# Patient Record
Sex: FEMALE | Race: ASIAN | NOT HISPANIC OR LATINO | Employment: FULL TIME | URBAN - METROPOLITAN AREA
[De-identification: names, ages, dates, MRNs, and addresses within clinical notes are randomized per-mention and may not be internally consistent; named-entity substitution may affect disease eponyms.]

---

## 2017-06-13 ENCOUNTER — OFFICE VISIT (OUTPATIENT)
Dept: FAMILY MEDICINE | Facility: CLINIC | Age: 22
End: 2017-06-13

## 2017-06-13 VITALS
DIASTOLIC BLOOD PRESSURE: 84 MMHG | BODY MASS INDEX: 28 KG/M2 | HEART RATE: 101 BPM | HEIGHT: 63 IN | OXYGEN SATURATION: 100 % | SYSTOLIC BLOOD PRESSURE: 128 MMHG | RESPIRATION RATE: 18 BRPM | TEMPERATURE: 98.8 F | WEIGHT: 158 LBS

## 2017-06-13 DIAGNOSIS — N91.2 AMENORRHEA: Primary | ICD-10-CM

## 2017-06-13 DIAGNOSIS — Z32.00 ENCOUNTER FOR CONFIRMATION OF PREGNANCY TEST RESULT WITH PHYSICAL EXAMINATION: ICD-10-CM

## 2017-06-13 LAB — HCG UR QL: POSITIVE

## 2017-06-13 NOTE — MR AVS SNAPSHOT
After Visit Summary   2017    Sarah Will    MRN: [id number]           Patient Information     Date Of Birth          1995        Visit Information        Provider Department      2017 4:20 PM Zulma Pabon MD [facility]        Today's Diagnoses     Amenorrhea    -  1    Encounter for confirmation of pregnancy test result with physical examination           Follow-ups after your visit        Your next 10 appointments already scheduled     2017  3:00 PM CDT   NEW OB with Zulma Pabon MD   [facility] ([facility])    [address] [place] [address]   [phone]              Who to contact     Please call your clinic at [phone] to:    Ask questions about your health    Make or cancel appointments    Discuss your medicines    Learn about your test results    Speak to your doctor   If you have compliments or concerns about an experience at your clinic, or if you wish to file a complaint, please contact [facility] Patient Relations at [phone] or email us at [email or web address]         Additional Information About Your Visit        MyChart Information     trueEX is an electronic gateway that provides easy, online access to your medical records. With trueEX, you can request a clinic appointment, read your test results, renew a prescription or communicate with your care team.     To sign up for trueEX visit the website at [email or web address]   You will be asked to enter the access code listed below, as well as some personal information. Please follow the directions to create your username and password.     Your access code is: [email or web address]  Expires: 2017  4:04 PM     Your access code will  in 90 days. If you need help or a new code, please contact your [facility] or call [phone] for assistance.        Care EveryWhere ID   "   This is your Care EveryWhere ID. This could be used by other organizations to access your [place] medical records  [id number]        Your Vitals Were     Pulse Temperature Respirations Height Last Period Pulse Oximetry    101 98.8  F (37.1  C) 18 5' 3\" (160 cm) 04/27/2017 100%    BMI (Body Mass Index)                   27.99 kg/m2            Blood Pressure from Last 3 Encounters:   06/13/17 128/84    Weight from Last 3 Encounters:   06/13/17 158 lb (71.7 kg)              We Performed the Following     HCG Qualitative Urine (UPT)  ([facility])        Primary Care Provider Office Phone # Fax #    Zulma Pabon [phone]       [facility] [address] [place] [address]        Equal Access to Services     [facility]: Hadii aad ku hadasho Soomaali, waaxda luqadaha, qaybta kaalmada adeegyada, waxay idiin hayaan kris maganaarapablo roberts . So [facility] [phone].    ATENCIÓN: Si habla español, tiene a cox disposición servicios gratuitos de asistencia lingüística. Llame al [phone].    We comply with applicable federal civil rights laws and [place] laws. We do not discriminate on the basis of race, color, national origin, age, disability sex, sexual orientation or gender identity.            Thank you!     Thank you for choosing [facility]  for your care. Our goal is always to provide you with excellent care. Hearing back from our patients is one way we can continue to improve our services. Please take a few minutes to complete the written survey that you may receive in the mail after your visit with us. Thank you!             Your Updated Medication List - Protect others around you: Learn how to safely use, store and throw away your medicines at www.disposemymeds.org.      Notice  As of 6/13/2017 11:59 PM    You have not been prescribed any medications.      "

## 2017-06-13 NOTE — PROGRESS NOTES
"       HPI:       Sarah Will is a 21 year old  female who presents to address the following concerns:    Positive pregnancy test at home:  Family was trying   Went to [facility] for previous pregnancy  Wants to see Dr Pabon specifically  Last period was April 27th    Has been pregnancy one other time and has 4 year old daughter.  Loan was born 2 days late    Tired but no other reported sx.  No excessive nausea or vomiting.  No blood loss, discharge or pain           PMHX:     There is no problem list on file for this patient.      No current outpatient prescriptions on file.          Allergies   Allergen Reactions     No Known Allergies        Results for orders placed or performed in visit on 06/13/17 (from the past 24 hour(s))   HCG Qualitative Urine (UPT)  (P )   Result Value Ref Range    HCG Qual Urine POSITIVE Negative       No current outpatient prescriptions on file.     No current facility-administered medications for this visit.               Review of Systems:   ROS as described above.  Denies F/S/C/N/V/SOB/CP          Physical Exam:     Vitals:    06/13/17 1603   BP: 128/84   Pulse: 101   Resp: 18   Temp: 98.8  F (37.1  C)   SpO2: 100%   Weight: 158 lb (71.7 kg)   Height: 5' 3\" (160 cm)     Body mass index is 27.99 kg/(m^2).    GEN: patient sitting comfortably in NAD  HEEN: Head is atraumatic, normocephalic, eyes anicteric, mucous membranes moist  CV: RRR w/o M/R/G  PULM: CTAB without w/r/r  ABD: soft, nontender, bowel sounds present  NEURO: Alert and oriented x3.  No focal motor abnormalities.  Face symmetric.  PSYCH: appropriate  SKIN: No rashes, bruising, or other lesions    Results for orders placed or performed in visit on 06/13/17   HCG Qualitative Urine (UPT)  (P )   Result Value Ref Range    HCG Qual Urine POSITIVE Negative       Assessment and Plan     1. Amenorrhea-upt positive in clinic today.  Will have patient follow up with OB coordinators to set up OB care at [facility].  Patient " has prenatal vitamins at home  - HCG Qualitative Urine (UPT)  ([facility])      Options for treatment and follow-up care were reviewed with the patient and/or guardian. Sarah Will and/or guardian engaged in the decision making process and verbalized understanding of the options discussed and agreed with the final plan.    Zulma Pabon MD

## 2017-06-22 ENCOUNTER — TELEPHONE (OUTPATIENT)
Dept: FAMILY MEDICINE | Facility: CLINIC | Age: 22
End: 2017-06-22

## 2017-06-22 NOTE — TELEPHONE ENCOUNTER
Met with patient and FOB in clinic when here for UPT and given OB/clinic information.     OB intake completed via phone. This is a . Pregnancy history of normal term vaginal delivery. No complication during pregnancy nor delivery. No history of medical condition. No family history of medical condition. No genetic history nor risk.    LMP 17, Sure date, regular menstrual cycles. Planned pregnancy. She and FOB are happy. They have a four year old daughter.  Pt has been feeling well this pregnancy, only tired. Patient works full time doing soft goods clothing production work. Voiced no other concern.    Will be seeing Dr. Pabon for OB care.

## 2017-06-27 ENCOUNTER — OFFICE VISIT (OUTPATIENT)
Dept: FAMILY MEDICINE | Facility: CLINIC | Age: 22
End: 2017-06-27

## 2017-06-27 VITALS
HEART RATE: 88 BPM | WEIGHT: 158.4 LBS | RESPIRATION RATE: 16 BRPM | HEIGHT: 63 IN | SYSTOLIC BLOOD PRESSURE: 129 MMHG | DIASTOLIC BLOOD PRESSURE: 87 MMHG | OXYGEN SATURATION: 100 % | TEMPERATURE: 98.1 F | BODY MASS INDEX: 28.07 KG/M2

## 2017-06-27 DIAGNOSIS — Z34.91 NORMAL PREGNANCY IN FIRST TRIMESTER: Primary | ICD-10-CM

## 2017-06-27 LAB
HEMOGLOBIN: 13.2 G/DL (ref 11.7–15.7)
HIV 1+2 AB+HIV1 P24 AG SERPL QL IA: NEGATIVE

## 2017-06-27 NOTE — PROGRESS NOTES
Sarah Will is a 21 year old  female who presents to clinic for a new OB visit.  Her last menstrual period was 17.  Sure of date.      Sx include fatigue but no nausea or vomiting.     Estimated Date of Delivery: 17 via LMP    She has not had bleeding since her LMP. She has had some mild intermittent cramping that does not stop her from what she is doing.  Unchanged from previous had any abdominal pain or cramping since her LMP. She has not had nausea. Weigh loss has not occurred. This was a planned pregnancy.     OTHER CONCERNS: no    Pre Term Labor Risk Assessment  Is the patient's age <18 or >40? No  Patint's BMI is Body mass index is 28.06 kg/(m^2).. Does patient have a BMI < 18.5? No  If previous pregnancy, was delivery within previous 6 months? No  Have you ever delivered a baby prior to 37 weeks gestation? No  Did conception for this pregnancy occur via In Vitro Fertilization? No  Summary: Patient is not high risk for  Labor for  labor.    There is no problem list on file for this patient.      Obstetric History       T1      L1     SAB0   TAB0   Ectopic0   Multiple0   Live Births1       # Outcome Date GA Lbr David/2nd Weight Sex Delivery Anes PTL Lv   2 Current            1 Term 12   6 lb 14 oz (3.118 kg) F Vag-Spont   ADRIENNE          History reviewed. No pertinent past medical history.    Past Surgical History:   Procedure Laterality Date     C AFF UPPER ARM/ELBOW SURGERY UNLISTED Left     Child, broken arm, pins        No current outpatient prescriptions on file.       Do you have a history of any of the following medical conditions?  Condition Yes/No   Hypertension No   Heart disease, mitral valve prolapse, rheumatic fever No   Asthma or another chronic lung disease No   An autoimmune disorder No   Kidney disease No   Frequent urinary tract infections No   Migraine headaches No   Stroke, loss of sensation/function, seizures, or other neuro problem No    Diabetes No   Thyroid problems or have you taken thyroid medication No   Hepatitis, liver disease, jaundice No   Blood clots, phlebitis, pulmonary embolism or varicose veins No   Excessive bleeding after surgery or dental work No   Heavy menstrual periods requiring treatment No   Anemia No   Blood transfusions No        Would you refuse a blood transfusion? No   Breast problems No   Abnormalities of the uterus No   Abnormal pap smear No   Have you been treated for an emotional disturbance? No   Have you been physically, sexually, or emotionally hurt by someone? No   Have you been in a major accident or suffered serious trauma? No   Have you had surgical procedures? No        Have you ever had any complications from anesthesia? No   Have you ever been hospitalized for a nonsurgical reason? No   Notes for positives: none    Prenatal Genetic Screening  Do you have a history of any of the following Yes/No        A metabolic disorder (e.g. Insulin-dependent DM, PKU) No        Recurrent pregnancy loss No     Do you, the baby's father, or anyone in your families have Yes/No        Thalassemia AND MCV <80 No        Hemophilia No        Neural tube defect No        Congenital heart defect No        Sickle cell disease or trait No        Muscular dystrophy No        Cystic fibrosis No        Mental retardation or autism No        Down's syndrome No        Ramu-Sach's disease No        Sandgap's chorea No        Any other inherited genetic or chromosomal disorder No        A child with birth defects not listed above No     Does not know biologic father.  Hx maternal side.    Infection History  At high risk for coming in contact with HIV No   Ever treated for tuberculosis No   Ever received the BCG vaccine for tuberculosis No   Ever had genital herpes (or has your partner) No   Had a rash or viral illness since LMP No   Ever had a sexually transmitted infection No   Ever had chicken pox or the vaccine Yes   Ever had any  "other serious infectious disease No   Up to date with immunizations Yes   STI History none      PERSONAL/SOCIAL HISTORY  Social History     Social History     Marital status: Single     Spouse name: N/A     Number of children: 1     Years of education: High School     Occupational History     Softgood clothing production      Full Time     Social History Main Topics     Smoking status: Never Smoker     Smokeless tobacco: None     Alcohol use Yes      Comment: there and here, not alot, none during pregnancy     Drug use: No     Sexual activity: Yes     Partners: Male     Other Topics Concern     None     Social History Narrative     Have you used any tobacco products, alcohol or any other drugs during this pregnancy before or after your knew you were pregnant? no    REVIEW OF SYSTEMS  C: NEGATIVE for fever, chills, change in weight  E: NEGATIVE for vision changes or irritation  ENT: NEGATIVE for ear, mouth and throat problems  R: NEGATIVE for significant cough or SOB  B: NEGATIVE for masses, tenderness or discharge  CV: NEGATIVE for chest pain, palpitations or peripheral edema  GI: NEGATIVE for nausea, abdominal pain, heartburn, or change in bowel habits  : NEGATIVE for unusual urinary or vaginal symptoms.   M: NEGATIVE for significant arthralgias or myalgia  N: NEGATIVE for weakness, dizziness or paresthesias  P: NEGATIVE for changes in mood or affect    PHYSICAL EXAM:  /87 (BP Location: Left arm, Patient Position: Sitting, Cuff Size: Adult Regular)  Pulse 88  Temp 98.1  F (36.7  C) (Oral)  Resp 16  Ht 5' 3\" (160 cm)  Wt 158 lb 6.4 oz (71.8 kg)  LMP 04/27/2017  SpO2 100%  BMI 28.06 kg/m2   GENERAL:  Pleasant pregnant female, alert, well groomed.  SKIN:  Warm and dry, without lesions or rashes  EYES:  PERRLA, EOM intact  MOUTH:  Buccal mucosa pink, moist without lesions.    NECK:  Thyroid without enlargement and nodules.  No cervical lymphadenopathy.  LUNGS:  Clear to auscultation.  BREAST:  " Symmetrical. No masses noted. No skin or nipple changes or axillary nodes.   HEART:  RRR without murmur.  ABDOMEN: Soft without masses , tenderness or organomegaly.  No CVA tenderness. No scars noted.. Fundus palpable at symphysis pubis. FHT not assessed  MUSCULOSKELETAL:  Full range of motion.  EXTREMITIES:  No edema. No significant varicosities.   GENITALIA:  Normal appearing anatomy, no lesions noted.  VAGINA:  Pink, normal rugae and discharge normal and physiologic,   CERVIX:  smooth, without discharge and parous os; firm, closed and long on bimanual exam.  UTERUS: Anteverted, nontender 6 weeks in size.  ADNEXA: Without masses or tenderness    ASSESSMENT/PLAN  There is no problem list on file for this patient.      Routine prenatal labs today. CBC, type and screen, rubella, HIV, gonorrhea/chlamydia, RPR, hepatitis B, urinalysis with culture. Pap smear due, done today.   1. Normal pregnancy in first trimester  - ABO/Rh Type-HML ([facility])  - Antibody Screen ([facility])  - Hepatitis B Surface Ag ([facility])  - Syphilis Screen [place] ([facility])  - HIV Ag/Ab Screen [place] ([facility])  - Rubella  IgG ([facility])  - Chlamydia/Gono Amplified ([facility])  - GYN Cytology ([facility])  - Hemoglobin (HGB) ([facility])  .     Discussed:  -recommended weight gain of 25-35 lbs. for BMI of Body mass index is 28.06 kg/(m^2)..  -Influenza vaccine discussed and declined.  -genetic screening options, including false positive rate of 5% with screening tests and diagnostic options (chorionic villus sampling, amniocentesis), and patient declines.   .  -safe medications during pregnancy. Is currently taking prenatal vitamin daily.   -healthy habits including not using tobacco or alcohol, exercising regularly and maintaining healthy diet  -information given on tips for dealing with nausea, healthy habits, exposures, safety, prenatal appointment schedule, and when to call the doctor.  -recommendations for breastfeeding.    Will  follow up in 4 weeks for routine pre- care.    Zulma Pabon MD

## 2017-06-27 NOTE — LETTER
July 6, 2017      Sarah Will  [address] [address]        Dear Sarah,    Please see below for your test results.  These are al normal.     Resulted Orders   ABO/Rh Type-HML ([facility])   Result Value Ref Range    ABO/Rh(D) O POS     Repeat ABO/Rh Typing (HML) O POS     Narrative    Test performed by:   [facility] [address], [place] [address]   Antibody Screen ([facility])   Result Value Ref Range    Antibody Screen Negative Negative    Narrative    Test performed by:   [facility]  [address], [place] [address]   Hepatitis B Surface Ag ([facility])   Result Value Ref Range    Hepatitis B Surface Antigen Negative Negative    Narrative    Test performed by:  [facility] [address] [place] [address]   Syphilis Screen [place] ([facility])   Result Value Ref Range    Syphilis Screen [place] Non-Reactive Non-Reactive    Narrative    Test performed by:  [facility] [address] [place] [address]   HIV Ag/Ab Screen [place] ([facility])   Result Value Ref Range    HIV Antigen/Antibody Negative Negative    Narrative    Test performed by:  [facility] [address] [place] [address]   Rubella  IgG ([facility])   Result Value Ref Range    Rubella IgG Immune Immune    Narrative    Test performed by:  [facility] [address] [place] [address]   Chlamydia/Gono Amplified ([facility])   Result Value Ref Range    Chlamydia trac,Amplified Prb Negative Negative    N gonorrhoeae,Amplified Prb Negative Negative    Narrative    Test performed by:  [facility] [address] [place] [address]   GYN Cytology ([facility])   Result Value Ref Range    Lab AP Case Report SEE RESULTS BELOW       Comment:      Gynecologic Cytology Report                       Case: [id number]                                     Authorizing Provider:  Zulma Pabon MD Collected:             06/27/2017 2444               First Screen:          DAVID Redman       Received:              06/28/2017 1113                                     (ASCP)                                                                         Specimen:    SUREPATH PAP, SCREENING, Endocervical/cervical                                               Lab AP Gyn Interpretation SEE RESULTS BELOW       Comment:      Negative for squamous intraepithelial lesion or malignancy  Electronically signed by DAVID Redman (ASCP) on 7/5/2017 at  1:40 PM      Lab AP Malignant? Normal Normal    Specimen adequacy:       Satisfactory for eval, endocerv/transform zone component absent, patient pregnant    HPV Reflex? No     High Risk? No     Last Mens Period 4/27/17     Lab AP Abnormal Bleeding No     Lab AP Patient Status pregnant     Lab AP Birth Control/Hormones None     Lab AP Previous Normal none     Lab AP Previous Abnl this is first pap     Lab AP Cervical Appearance normal     Narrative    Test performed by:  [facility] [address] [place] [address]   Hemoglobin (HGB) ([facility])   Result Value Ref Range    Hemoglobin 13.2 11.7 - 15.7 g/dL       If you have any questions, please call the clinic to make an appointment.    Sincerely,    Zulma Pabon MD

## 2017-06-27 NOTE — MR AVS SNAPSHOT
"              After Visit Summary   2017    Sarah Will    MRN: [id number]           Patient Information     Date Of Birth          1995        Visit Information        Provider Department      2017 3:00 PM Zulma Pabon MD [facility]        Today's Diagnoses     Normal pregnancy in first trimester    -  1       Follow-ups after your visit        Who to contact     Please call your clinic at [phone] to:    Ask questions about your health    Make or cancel appointments    Discuss your medicines    Learn about your test results    Speak to your doctor   If you have compliments or concerns about an experience at your clinic, or if you wish to file a complaint, please contact [facility] Patient Relations at [phone] or email us at [email or web address]         Additional Information About Your Visit        MyChart Information     ticketea is an electronic gateway that provides easy, online access to your medical records. With ticketea, you can request a clinic appointment, read your test results, renew a prescription or communicate with your care team.     To sign up for Flippst visit the website at [email or web address]   You will be asked to enter the access code listed below, as well as some personal information. Please follow the directions to create your username and password.     Your access code is: [email or web address]  Expires: 2017  4:04 PM     Your access code will  in 90 days. If you need help or a new code, please contact your [facility] or call [phone] for assistance.        Care EveryWhere ID     This is your Care EveryWhere ID. This could be used by other organizations to access your [place] medical records  [id number]        Your Vitals Were     Pulse Temperature Respirations Height Last Period Pulse Oximetry    88 98.1  F (36.7  C) (Oral) 16 5' 3\" (160 cm) 2017 100%    " BMI (Body Mass Index)                   28.06 kg/m2            Blood Pressure from Last 3 Encounters:   06/27/17 129/87   06/13/17 128/84    Weight from Last 3 Encounters:   06/27/17 158 lb 6.4 oz (71.8 kg)   06/13/17 158 lb (71.7 kg)              We Performed the Following     ABO/Rh Type-HML ([facility])     Antibody Screen ([facility])     Chlamydia/Gono Amplified ([facility])     Culture Urine ([facility])     GYN Cytology ([facility])     Hepatitis B Surface Ag ([facility])     HIV Ag/Ab Screen [place] ([facility])     Rubella  IgG ([facility])     Syphilis Screen [place] ([facility])     Urinalysis([facility])        Primary Care Provider Office Phone # Fax #    Zulma Pabon [phone]       [facility] [address] [place] [address]        Equal Access to Services     [facility]: Hadii aad ku hadasho Soomaali, waaxda luqadaha, qaybta kaalmada adeegyada, waxay allyin hayaan kris roberts . So [facility] [phone].    ATENCIÓN: Si habla español, tiene a cox disposición servicios gratuitos de asistencia lingüística. Llame al [phone].    We comply with applicable federal civil rights laws and [place] laws. We do not discriminate on the basis of race, color, national origin, age, disability sex, sexual orientation or gender identity.            Thank you!     Thank you for choosing [facility]  for your care. Our goal is always to provide you with excellent care. Hearing back from our patients is one way we can continue to improve our services. Please take a few minutes to complete the written survey that you may receive in the mail after your visit with us. Thank you!             Your Updated Medication List - Protect others around you: Learn how to safely use, store and throw away your medicines at www.disposemymeds.org.      Notice  As of 6/27/2017  3:58 PM    You have not been prescribed any medications.

## 2017-06-28 LAB
ABO + RH BLD: NORMAL
BLD GP AB SCN SERPL QL: NEGATIVE
C TRACH RRNA CVX QL NAA+PROBE: NEGATIVE
HBV SURFACE AG SERPL QL IA: NEGATIVE
N GONORRHOEA RRNA SPEC QL NAA+PROBE: NEGATIVE
REPEAT ABO/RH TYPING (HML): NORMAL
RPR SER QL: NORMAL
RUBV IGG SERPL QL IA: NORMAL

## 2017-07-05 ENCOUNTER — RECORDS - HEALTHEAST (OUTPATIENT)
Dept: ADMINISTRATIVE | Facility: OTHER | Age: 22
End: 2017-07-05

## 2017-07-05 LAB
HIGH RISK?: NO
HPV REFLEX?: NO
LAB AP ABNORMAL BLEEDING: NO
LAB AP BIRTH CONTROL/HORMONES: NORMAL
LAB AP CASE REPORT: NORMAL
LAB AP CERVICAL APPEARANCE: NORMAL
LAB AP GYN INTERPRETATION: NORMAL
LAB AP MALIGNANT?: NORMAL
LAB AP PATIENT STATUS: NORMAL
LAB AP PREVIOUS ABNL: NORMAL
LAB AP PREVIOUS NORMAL: NORMAL
LAST MENS PERIOD: NORMAL
SPECIMEN ADEQUACY:: NORMAL

## 2017-07-24 ENCOUNTER — OFFICE VISIT (OUTPATIENT)
Dept: FAMILY MEDICINE | Facility: CLINIC | Age: 22
End: 2017-07-24

## 2017-07-24 VITALS
HEART RATE: 92 BPM | HEIGHT: 63 IN | SYSTOLIC BLOOD PRESSURE: 107 MMHG | DIASTOLIC BLOOD PRESSURE: 74 MMHG | OXYGEN SATURATION: 100 % | TEMPERATURE: 98.3 F | WEIGHT: 158.8 LBS | BODY MASS INDEX: 28.14 KG/M2

## 2017-07-24 DIAGNOSIS — Z34.01 ENCOUNTER FOR SUPERVISION OF NORMAL FIRST PREGNANCY IN FIRST TRIMESTER: Primary | ICD-10-CM

## 2017-07-24 NOTE — PROGRESS NOTES
"Sarah Will is a 22 year old  female who presents to clinic for a follow up OB visit. She is currently 74wpcoy8sop with an estimated date of delivery of 18 via LMP 17. She denies headache, chest pain, shortness of breath, abdominal pain/contractions, vaginal bleeding, or abnormal discharge. She has not felt baby move.     New concerns today: none at this time. Since last visit, pt reported some morning sickness/nausea with 2 episodes of emesis that resolved.     Obstetric History       T1      L1     SAB0   TAB0   Ectopic0   Multiple0   Live Births1       # Outcome Date GA Lbr David/2nd Weight Sex Delivery Anes PTL Lv   2 Current            1 Term 12   6 lb 14 oz (3.118 kg) F Vag-Spont   ADRIENNE          Physical exam:  /74  Pulse 92  Temp 98.3  F (36.8  C) (Oral)  Ht 5' 3\" (160 cm)  Wt 158 lb 12.8 oz (72 kg)  LMP 2017  SpO2 100%  BMI 28.13 kg/m2    General: alert, female in no acute distress  Abdomen: gravid uterus appropriate for gestation age above pubic symphysis, non-tender, FHTs 160  Extremities: no edema    Results for orders placed or performed in visit on 17   ABO/Rh Type-HML ([facility])   Result Value Ref Range    ABO/Rh(D) O POS     Repeat ABO/Rh Typing (HML) O POS     Narrative    Test performed by:   [facility]  [address], [place] [address]   Antibody Screen ([facility])   Result Value Ref Range    Antibody Screen Negative Negative    Narrative    Test performed by:   [facility]  [address], [place] [address]   Hepatitis B Surface Ag ([facility])   Result Value Ref Range    Hepatitis B Surface Antigen Negative Negative    Narrative    Test performed by:  [facility] [address] [place] [address]   Syphilis Screen [place] ([facility])   Result Value Ref Range    Syphilis Screen [place] Non-Reactive Non-Reactive    Narrative    Test performed by:  [facility] [address] [place] [address] "   HIV Ag/Ab Screen [place] ([facility])   Result Value Ref Range    HIV Antigen/Antibody Negative Negative    Narrative    Test performed by:  [facility] [address] [place] [address]   Rubella  IgG ([facility])   Result Value Ref Range    Rubella IgG Immune Immune    Narrative    Test performed by:  [facility] [address] [place] [address]   Chlamydia/Gono Amplified ([facility])   Result Value Ref Range    Chlamydia trac,Amplified Prb Negative Negative    N gonorrhoeae,Amplified Prb Negative Negative    Narrative    Test performed by:  [facility] [address] [place] [address]   GYN Cytology ([facility])   Result Value Ref Range    Lab AP Case Report SEE RESULTS BELOW     Lab AP Gyn Interpretation SEE RESULTS BELOW     Lab AP Malignant? Normal Normal    Specimen adequacy:       Satisfactory for eval, endocerv/transform zone component absent, patient pregnant    HPV Reflex? No     High Risk? No     Last Mens Period 17     Lab AP Abnormal Bleeding No     Lab AP Patient Status pregnant     Lab AP Birth Control/Hormones None     Lab AP Previous Normal none     Lab AP Previous Abnl this is first pap     Lab AP Cervical Appearance normal     Narrative    Test performed by:  [facility] [address] [place] [address]   Hemoglobin (HGB) ([facility])   Result Value Ref Range    Hemoglobin 13.2 11.7 - 15.7 g/dL       Assessment/Plan:  Normal pregnancy.    Reviewed normal pre-saima labs.   Reviewed genetic screening options: patient declines at this time    Doing well.  Heart beat auscultated today and normal.  Continue PNV    Follow up in 4 weeks for routine prenatal visit.     Kamilla Molina, MS3 acting as a medical scribe for Dr. Cm Pabon MD    The medical student acted as a scribe and the encounter documented above was performed completely by me and the documentation accurately reflects the work I have performed today.       Zulma Pabon MD

## 2017-07-24 NOTE — MR AVS SNAPSHOT
After Visit Summary   2017    Sarah Will    MRN: [id number]           Patient Information     Date Of Birth          1995        Visit Information        Provider Department      2017 10:00 AM Zulma Pabon MD [facility]        Today's Diagnoses     Encounter for supervision of normal first pregnancy in first trimester    -  1       Follow-ups after your visit        Your next 10 appointments already scheduled     Aug 18, 2017  4:00 PM CDT   RETURN OB with Zulma Pabon MD   [facility] ([facility])    [address] [place] [address]   [phone]              Who to contact     Please call your clinic at [phone] to:    Ask questions about your health    Make or cancel appointments    Discuss your medicines    Learn about your test results    Speak to your doctor   If you have compliments or concerns about an experience at your clinic, or if you wish to file a complaint, please contact [facility] Patient Relations at [phone] or email us at [email or web address]         Additional Information About Your Visit        MyChart Information     EverZero is an electronic gateway that provides easy, online access to your medical records. With EverZero, you can request a clinic appointment, read your test results, renew a prescription or communicate with your care team.     To sign up for EverZero visit the website at [email or web address]   You will be asked to enter the access code listed below, as well as some personal information. Please follow the directions to create your username and password.     Your access code is: [email or web address]  Expires: 2017  4:04 PM     Your access code will  in 90 days. If you need help or a new code, please contact your [facility] or call [phone] for assistance.        Care EveryWhere ID     This is your Care  "EveryWhere ID. This could be used by other organizations to access your [place] medical records  [id number]        Your Vitals Were     Pulse Temperature Height Last Period Pulse Oximetry BMI (Body Mass Index)    92 98.3  F (36.8  C) (Oral) 5' 3\" (160 cm) 04/27/2017 100% 28.13 kg/m2       Blood Pressure from Last 3 Encounters:   07/24/17 107/74   06/27/17 129/87   06/13/17 128/84    Weight from Last 3 Encounters:   07/24/17 158 lb 12.8 oz (72 kg)   06/27/17 158 lb 6.4 oz (71.8 kg)   06/13/17 158 lb (71.7 kg)              Today, you had the following     No orders found for display       Primary Care Provider Office Phone # Fax #    Zulma Pabon [phone]       [facility] [address] [place] [address]        Equal Access to Services     [facility]: Hadii aad ku hadasho Soomaali, waaxda luqadaha, qaybta kaalmada adeegyada, waxay idiin hayaan adeeg chinoarapablo la'jose juan . So [facility] [phone].    ATENCIÓN: Si habla español, tiene a cox disposición servicios gratuitos de asistencia lingüística. Llame al [phone].    We comply with applicable federal civil rights laws and [place] laws. We do not discriminate on the basis of race, color, national origin, age, disability sex, sexual orientation or gender identity.            Thank you!     Thank you for choosing [facility]  for your care. Our goal is always to provide you with excellent care. Hearing back from our patients is one way we can continue to improve our services. Please take a few minutes to complete the written survey that you may receive in the mail after your visit with us. Thank you!             Your Updated Medication List - Protect others around you: Learn how to safely use, store and throw away your medicines at www.disposemymeds.org.      Notice  As of 7/24/2017 11:59 PM    You have not been prescribed any medications.      "

## 2017-07-24 NOTE — LETTER
RETURN TO WORK/SCHOOL FORM    7/24/2017    Re: Sarah Will  1995      To Whom It May Concern:     This letter is to confirm that Sarah Will is pregnant.       Zulma Pabon MD  7/24/2017 10:41 AM

## 2017-08-18 ENCOUNTER — OFFICE VISIT (OUTPATIENT)
Dept: FAMILY MEDICINE | Facility: CLINIC | Age: 22
End: 2017-08-18

## 2017-08-18 VITALS
TEMPERATURE: 98.5 F | HEIGHT: 63 IN | DIASTOLIC BLOOD PRESSURE: 76 MMHG | SYSTOLIC BLOOD PRESSURE: 117 MMHG | HEART RATE: 85 BPM | WEIGHT: 163 LBS | BODY MASS INDEX: 28.88 KG/M2 | OXYGEN SATURATION: 100 % | RESPIRATION RATE: 19 BRPM

## 2017-08-18 DIAGNOSIS — Z34.92 NORMAL PREGNANCY, SECOND TRIMESTER: Primary | ICD-10-CM

## 2017-08-18 NOTE — PROGRESS NOTES
"Sarah Will is a 22 year old  female who presents to clinic for a follow up OB visit. She is currently 16w1d with an estimated date of delivery of 2018 via LMP. She denies headache, chest pain, shortness of breath, abdominal pain/contractions, vaginal bleeding, or abnormal discharge. She has not felt baby move.     New concerns today:   none    Obstetric History       T1      L1     SAB0   TAB0   Ectopic0   Multiple0   Live Births1       # Outcome Date GA Lbr David/2nd Weight Sex Delivery Anes PTL Lv   2 Current            1 Term 12   6 lb 14 oz (3.118 kg) F Vag-Spont   ADRIENNE          Physical exam:  /76  Pulse 85  Temp 98.5  F (36.9  C)  Resp 19  Ht 5' 3\" (160 cm)  Wt 163 lb (73.9 kg)  LMP 2017  SpO2 100%  BMI 28.87 kg/m2    General: alert, female in no acute distress  Abdomen: gravid uterus appropriate for gestation age above pubic symphysis, non-tender, FHTs 2  Extremities: no edema    Assessment/Plan:  There is no problem list on file for this patient.      Reviewed pre-saima labs. Follow up US, MERLY 4 weeks.  Declined genetic testing  Discussed warning signs to watch for and expectations for second trimester.     Follow up in 4 weeks for routine prenatal visit.     Zulma Pabon MD            "

## 2017-08-18 NOTE — MR AVS SNAPSHOT
After Visit Summary   8/18/2017    Sarah Will    MRN: [id number]           Patient Information     Date Of Birth          1995        Visit Information        Provider Department      8/18/2017 4:00 PM Zulma Pabon MD [facility]        Today's Diagnoses     Normal pregnancy, second trimester    -  1       Follow-ups after your visit        Your next 10 appointments already scheduled     Sep 14, 2017  8:00 AM CDT   ULTRASOUND with Madiha CHRIS Hebert   [facility] ([facility])    [address] [place] [address]   [phone]           An adult must be present if children accompany the patient being seen.            Sep 15, 2017  8:20 AM CDT   RETURN OB with Zulma Pabon MD   [facility] ([facility])    [address] [place] [address]   [phone]              Who to contact     Please call your clinic at [phone] to:    Ask questions about your health    Make or cancel appointments    Discuss your medicines    Learn about your test results    Speak to your doctor   If you have compliments or concerns about an experience at your clinic, or if you wish to file a complaint, please contact [facility] Patient Relations at [phone] or email us at [email or web address]         Additional Information About Your Visit        MyChart Information     Firework is an electronic gateway that provides easy, online access to your medical records. With Firework, you can request a clinic appointment, read your test results, renew a prescription or communicate with your care team.     To sign up for Jaspersoftt visit the website at [email or web address]   You will be asked to enter the access code listed below, as well as some personal information. Please follow the directions to create your username and password.     Your access code is: [email or web address]  Expires: 9/11/2017  4:04  "PM     Your access code will  in 90 days. If you need help or a new code, please contact your [facility] or call [phone] for assistance.        Care EveryWhere ID     This is your Care EveryWhere ID. This could be used by other organizations to access your [place] medical records  [id number]        Your Vitals Were     Pulse Temperature Respirations Height Last Period Pulse Oximetry    85 98.5  F (36.9  C) 19 5' 3\" (160 cm) 2017 100%    BMI (Body Mass Index)                   28.87 kg/m2            Blood Pressure from Last 3 Encounters:   17 117/76   17 107/74   17 129/87    Weight from Last 3 Encounters:   17 163 lb (73.9 kg)   17 158 lb 12.8 oz (72 kg)   17 158 lb 6.4 oz (71.8 kg)              Today, you had the following     No orders found for display       Primary Care Provider Office Phone # Fax #    Zulma Nalini Pabon [phone]       [facility] [address] [place] [address]        Equal Access to Services     [facility] : Hadii aad ku hadasho Soomaali, waaxda luqadaha, qaybta kaalmada adeegyada, waxay idiin hayaan adeeg chinoarapablo la'cristofern ah. So [facility] [phone].    ATENCIÓN: Si habla español, tiene a cox disposición servicios gratuitos de asistencia lingüística. [facility] [phone].    We comply with applicable federal civil rights laws and [place] laws. We do not discriminate on the basis of race, color, national origin, age, disability sex, sexual orientation or gender identity.            Thank you!     Thank you for choosing [facility]  for your care. Our goal is always to provide you with excellent care. Hearing back from our patients is one way we can continue to improve our services. Please take a few minutes to complete the written survey that you may receive in the mail after your visit with us. Thank you!             Your Updated Medication List - Protect others " around you: Learn how to safely use, store and throw away your medicines at www.disposemymeds.org.      Notice  As of 8/18/2017 11:59 PM    You have not been prescribed any medications.

## 2017-08-27 PROBLEM — Z34.92 NORMAL PREGNANCY, SECOND TRIMESTER: Status: ACTIVE | Noted: 2017-08-27

## 2017-09-14 DIAGNOSIS — Z34.82 ENCOUNTER FOR SUPERVISION OF OTHER NORMAL PREGNANCY, SECOND TRIMESTER: Primary | ICD-10-CM

## 2017-09-15 ENCOUNTER — OFFICE VISIT (OUTPATIENT)
Dept: FAMILY MEDICINE | Facility: CLINIC | Age: 22
End: 2017-09-15

## 2017-09-15 VITALS
DIASTOLIC BLOOD PRESSURE: 77 MMHG | HEART RATE: 81 BPM | BODY MASS INDEX: 29.1 KG/M2 | WEIGHT: 164.25 LBS | OXYGEN SATURATION: 100 % | RESPIRATION RATE: 19 BRPM | HEIGHT: 63 IN | SYSTOLIC BLOOD PRESSURE: 122 MMHG | TEMPERATURE: 98 F

## 2017-09-15 DIAGNOSIS — Z34.92 NORMAL PREGNANCY, SECOND TRIMESTER: Primary | ICD-10-CM

## 2017-09-15 NOTE — PROGRESS NOTES
"Sarah Will is a 22 year old  female who presents to clinic for a follow up OB visit. She is currently 20w1d with an estimated date of delivery of 2018 via LMP. She denies headache, chest pain, shortness of breath, abdominal pain/contractions, vaginal bleeding, or abnormal discharge. She has felt baby move. Unsureof this    New concerns today:   Recent URI-daughter was also sick for a breif period    US yesterday.  It's a BOY    Obstetric History       T1      L1     SAB0   TAB0   Ectopic0   Multiple0   Live Births1       # Outcome Date GA Lbr David/2nd Weight Sex Delivery Anes PTL Lv   2 Current            1 Term 12   6 lb 14 oz (3.118 kg) F Vag-Spont   ADRIENNE          Physical exam:  /77  Pulse 81  Temp 98  F (36.7  C) (Oral)  Resp 19  Ht 5' 3\" (160 cm)  Wt 164 lb 4 oz (74.5 kg)  LMP 2017  SpO2 100%  BMI 29.1 kg/m2    General: alert, female in no acute distress  Abdomen: gravid uterus appropriate for gestation age above pubic symphysis, non-tender, FHTs 140s  Extremities: no edema    US sdone yestedfay but not available for read.  WIll follow up   Assessment/Plan:  Patient Active Problem List   Diagnosis     Normal pregnancy, second trimester       Fetal anatomy ultrasound planned for 22 weeks.  Discussed expectations of second trimester and when to call/come in.  Continued encouragement of breastfeeding.    Follow up in 4 weeks for routine prenatal visit and fetal survey.  Will come with daughter.   Discussed care for common cold in pregnancy  Flu shot today    Zulma Pabon MD      "

## 2017-09-15 NOTE — MR AVS SNAPSHOT
After Visit Summary   9/15/2017    Sarah Will    MRN: [id number]           Patient Information     Date Of Birth          1995        Visit Information        Provider Department      9/15/2017 8:20 AM Zulma Pabon MD [facility]        Today's Diagnoses     Normal pregnancy, second trimester    -  1      Care Instructions    [facility] Information  If you have any further concerns or wish to schedule another appointment, please call our office at [phone] during normal business hours (8-5, M-F).     For uncomplicated pregnancies, you will be seeing your doctor once a month until you are 28 weeks, then you will see your doctor twice a month. You will begin weekly visits at 36 weeks until you deliver.     If you have urgent medical questions that cannot wait, you may call [phone] at any time of day.     If you have a medical emergency, please call [phone].    When to call or come in to the hospital  If you notice a decrease in your baby's movement.   If your begin to experience contractions that are 5 minutes or less apart and lasting for over 45 seconds, or if contractions are becoming more painful.  If you have any bleeding or leakage of fluids.   If you have a headache not resolved with tylenol, right upper abdominal pain, or sudden onset of swelling.  You know your body best. Never hesitate to call or go to the hospital if something doesn't feel right!  Gestational Diabetes Screen  At your next visit, you will be screened for gestational diabetes. You will drink a sugary drink and then have your blood drawn to see how your body responds to a sugar load. This test takes about an hour to complete - please plan your schedule accordingly!  The Benefits of Breastfeeding   Breastfeeding gives your new baby the very best start. It supplies nutrition, comfort, and love. Experts agree: Breastfeeding is the healthiest choice for babies during the first year of life  and beyond. It s healthy for Mom, too. Breastfeeding may be challenging at first. But with support, you and your baby can succeed together.   Healthiest for Baby   Breastmilk is the ideal food for babies. It has all the nutrients your little one needs to grow healthy and strong. Here are some of the many benefits for your baby:   Breastfeeding provides contact that babies love. Frequent skin-to-skin time with Mom is calming and comforting.   Breastmilk is full of antibodies. These are substances that help your baby fight infection. Breastmilk reduces your baby's risk of respiratory illnesses, ear infections, and diarrhea.   Breastfeeding reduces your baby s risk of allergies, colds, and many other diseases.   Breastmilk changes as your baby grows, meeting her changing needs.   Breastmilk is easy for your baby to digest.   Breastmilk contains DHA, a fat that is good for your baby s developing brain and eyes.   Breastmilk decreases the risk of sudden infant death syndrome (SIDS).  Healthiest for Mom   For many women, breastfeeding is an amazing experience. It creates a strong bond between mother and baby. Other benefits for Mom include:   You can feel proud knowing that your baby is growing healthy and strong because of your milk.   Breastmilk is convenient. It s free, clean, and always the right temperature.   Breastfeeding burns calories. This can help you lose pregnancy weight faster.   Breastfeeding releases hormones that contract the uterus. This helps the uterus return to its normal size after childbirth.   Mothers who breastfeed have a decreased risk of ovarian and breast cancers.  There are many people who can help you learn to breastfeed. A lactation consultant is a healthcare provider specially trained to help breastfeeding moms. A lactation consultant will visit you after delivery and is available after your baby is born - if you'd like to meet with lactation prior to delivery, let your doctor know!  "              Follow-ups after your visit        Your next 10 appointments already scheduled     Oct 16, 2017  8:00 AM CDT   RETURN OB with Zulma Pabon MD   [facility] ([facility])    [address] [place] [address]   [phone]              Who to contact     Please call your clinic at [phone] to:    Ask questions about your health    Make or cancel appointments    Discuss your medicines    Learn about your test results    Speak to your doctor   If you have compliments or concerns about an experience at your clinic, or if you wish to file a complaint, please contact [facility] Patient Relations at [phone] or email us at [email or web address]         Additional Information About Your Visit        CertiVoxhart Information     Everypost is an electronic gateway that provides easy, online access to your medical records. With Everypost, you can request a clinic appointment, read your test results, renew a prescription or communicate with your care team.     To sign up for Everypost visit the website at [email or web address]   You will be asked to enter the access code listed below, as well as some personal information. Please follow the directions to create your username and password.     Your access code is: [email or web address]  Expires: 2017 11:27 AM     Your access code will  in 90 days. If you need help or a new code, please contact your [facility] or call [phone] for assistance.        Care EveryWhere ID     This is your Care EveryWhere ID. This could be used by other organizations to access your [place] medical records  [id number]        Your Vitals Were     Pulse Temperature Respirations Height Last Period Pulse Oximetry    81 98  F (36.7  C) (Oral) 19 5' 3\" (160 cm) 2017 100%    BMI (Body Mass Index)                   29.1 kg/m2            Blood Pressure from Last 3 Encounters: "   09/15/17 122/77   08/18/17 117/76   07/24/17 107/74    Weight from Last 3 Encounters:   09/15/17 164 lb 4 oz (74.5 kg)   08/18/17 163 lb (73.9 kg)   07/24/17 158 lb 12.8 oz (72 kg)              We Performed the Following     ADMIN VACCINE, INITIAL     Flu vaccine, quad, preserve-free, 0.5 ml        Primary Care Provider Office Phone # Fax #    Zulma Nalini Pabon [phone]       [facility] [address] [place] [address]        Equal Access to Services     [facility]: Hadii aad ku hadasho Soomaali, waaxda luqadaha, qaybta kaalmada adeegyada, shabbir wu haycristofern kris roberts . So [facility] [phone].    ATENCIÓN: Si habla español, tiene a cox disposición servicios gratuitos de asistencia lingüística. Llame al [phone].    We comply with applicable federal civil rights laws and [place] laws. We do not discriminate on the basis of race, color, national origin, age, disability sex, sexual orientation or gender identity.            Thank you!     Thank you for choosing [facility]  for your care. Our goal is always to provide you with excellent care. Hearing back from our patients is one way we can continue to improve our services. Please take a few minutes to complete the written survey that you may receive in the mail after your visit with us. Thank you!             Your Updated Medication List - Protect others around you: Learn how to safely use, store and throw away your medicines at www.disposemymeds.org.      Notice  As of 9/15/2017 11:27 AM    You have not been prescribed any medications.

## 2017-09-15 NOTE — NURSING NOTE
Sarah Will      1.  Has the patient received the information for the influenza vaccine? NO    2.  Does the patient have any of the following contraindications?     Allergy to eggs?  No     Allergic reaction to previous influenza vaccines?  No     Any other problems to previous influenza vaccines?  No     Paralyzed by Guillain-[place] syndrome?  No     Currently pregnant? Yes, 5 months along.     Current moderate or severe illness?  No    Vaccination given by MA.  Recorded by Amy [place]

## 2017-09-15 NOTE — PATIENT INSTRUCTIONS
[facility] Information  If you have any further concerns or wish to schedule another appointment, please call our office at [phone] during normal business hours (8-5, M-F).     For uncomplicated pregnancies, you will be seeing your doctor once a month until you are 28 weeks, then you will see your doctor twice a month. You will begin weekly visits at 36 weeks until you deliver.     If you have urgent medical questions that cannot wait, you may call [phone] at any time of day.     If you have a medical emergency, please call [phone].    When to call or come in to the hospital  If you notice a decrease in your baby's movement.   If your begin to experience contractions that are 5 minutes or less apart and lasting for over 45 seconds, or if contractions are becoming more painful.  If you have any bleeding or leakage of fluids.   If you have a headache not resolved with tylenol, right upper abdominal pain, or sudden onset of swelling.  You know your body best. Never hesitate to call or go to the hospital if something doesn't feel right!  Gestational Diabetes Screen  At your next visit, you will be screened for gestational diabetes. You will drink a sugary drink and then have your blood drawn to see how your body responds to a sugar load. This test takes about an hour to complete - please plan your schedule accordingly!  The Benefits of Breastfeeding   Breastfeeding gives your new baby the very best start. It supplies nutrition, comfort, and love. Experts agree: Breastfeeding is the healthiest choice for babies during the first year of life and beyond. It s healthy for Mom, too. Breastfeeding may be challenging at first. But with support, you and your baby can succeed together.   Healthiest for Baby   Breastmilk is the ideal food for babies. It has all the nutrients your little one needs to grow healthy and strong. Here are some of the many benefits for your baby:   Breastfeeding provides contact that  babies love. Frequent skin-to-skin time with Mom is calming and comforting.   Breastmilk is full of antibodies. These are substances that help your baby fight infection. Breastmilk reduces your baby's risk of respiratory illnesses, ear infections, and diarrhea.   Breastfeeding reduces your baby s risk of allergies, colds, and many other diseases.   Breastmilk changes as your baby grows, meeting her changing needs.   Breastmilk is easy for your baby to digest.   Breastmilk contains DHA, a fat that is good for your baby s developing brain and eyes.   Breastmilk decreases the risk of sudden infant death syndrome (SIDS).  Healthiest for Mom   For many women, breastfeeding is an amazing experience. It creates a strong bond between mother and baby. Other benefits for Mom include:   You can feel proud knowing that your baby is growing healthy and strong because of your milk.   Breastmilk is convenient. It s free, clean, and always the right temperature.   Breastfeeding burns calories. This can help you lose pregnancy weight faster.   Breastfeeding releases hormones that contract the uterus. This helps the uterus return to its normal size after childbirth.   Mothers who breastfeed have a decreased risk of ovarian and breast cancers.  There are many people who can help you learn to breastfeed. A lactation consultant is a healthcare provider specially trained to help breastfeeding moms. A lactation consultant will visit you after delivery and is available after your baby is born - if you'd like to meet with lactation prior to delivery, let your doctor know!

## 2017-10-16 ENCOUNTER — OFFICE VISIT (OUTPATIENT)
Dept: FAMILY MEDICINE | Facility: CLINIC | Age: 22
End: 2017-10-16

## 2017-10-16 VITALS
HEART RATE: 80 BPM | TEMPERATURE: 97.7 F | RESPIRATION RATE: 20 BRPM | OXYGEN SATURATION: 100 % | HEIGHT: 63 IN | WEIGHT: 169.8 LBS | DIASTOLIC BLOOD PRESSURE: 77 MMHG | SYSTOLIC BLOOD PRESSURE: 115 MMHG | BODY MASS INDEX: 30.09 KG/M2

## 2017-10-16 DIAGNOSIS — Z34.92 NORMAL PREGNANCY, SECOND TRIMESTER: Primary | ICD-10-CM

## 2017-10-16 NOTE — MR AVS SNAPSHOT
"              After Visit Summary   10/16/2017    Sarah Will    MRN: [id number]           Patient Information     Date Of Birth          1995        Visit Information        Provider Department      10/16/2017 8:00 AM Zulma Pabon MD [facility]        Today's Diagnoses     Normal pregnancy, second trimester    -  1       Follow-ups after your visit        Who to contact     Please call your clinic at [phone] to:    Ask questions about your health    Make or cancel appointments    Discuss your medicines    Learn about your test results    Speak to your doctor   If you have compliments or concerns about an experience at your clinic, or if you wish to file a complaint, please contact [facility] Patient Relations at [phone] or email us at [email or web address]         Additional Information About Your Visit        MyChart Information     Minervaxt is an electronic gateway that provides easy, online access to your medical records. With [email or web address], you can request a clinic appointment, read your test results, renew a prescription or communicate with your care team.     To sign up for Minervaxt visit the website at [email or web address]   You will be asked to enter the access code listed below, as well as some personal information. Please follow the directions to create your username and password.     Your access code is: [email or web address]  Expires: 2017 11:27 AM     Your access code will  in 90 days. If you need help or a new code, please contact your [facility] or call [phone] for assistance.        Care EveryWhere ID     This is your Care EveryWhere ID. This could be used by other organizations to access your [facility] medical records  [id number]        Your Vitals Were     Pulse Temperature Respirations Height Last Period Pulse Oximetry    80 97.7  F (36.5  C) 20 5' 3\" (160 cm) 2017 100%    BMI " (Body Mass Index)                   30.08 kg/m2            Blood Pressure from Last 3 Encounters:   10/16/17 115/77   09/15/17 122/77   08/18/17 117/76    Weight from Last 3 Encounters:   10/16/17 169 lb 12.8 oz (77 kg)   09/15/17 164 lb 4 oz (74.5 kg)   08/18/17 163 lb (73.9 kg)              Today, you had the following     No orders found for display       Primary Care Provider Office Phone # Fax #    Zulma Pabon [phone]       [facility] [address] [place] [address]        Equal Access to Services     [facility] SAY : Hadii cinda rothman hadasho Sosophieali, waaxda luqadaha, qaybta kaalmada adeegyada, shabbir roberts . So [facility] [phone].    ATENCIÓN: Si habla español, tiene a cox disposición servicios gratuitos de asistencia lingüística. Llame al [phone].    We comply with applicable federal civil rights laws and [place] laws. We do not discriminate on the basis of race, color, national origin, age, disability, sex, sexual orientation, or gender identity.            Thank you!     Thank you for choosing [facility]  for your care. Our goal is always to provide you with excellent care. Hearing back from our patients is one way we can continue to improve our services. Please take a few minutes to complete the written survey that you may receive in the mail after your visit with us. Thank you!             Your Updated Medication List - Protect others around you: Learn how to safely use, store and throw away your medicines at www.disposemymeds.org.      Notice  As of 10/16/2017  8:45 AM    You have not been prescribed any medications.

## 2017-10-16 NOTE — PROGRESS NOTES
"Sarah Will is a 22 year old  female who presents to clinic for a follow up OB visit. She is currently 24w4d with an estimated date of delivery of 2018 via early us. She denies headache, chest pain, shortness of breath, abdominal pain/contractions, vaginal bleeding, or abnormal discharge. She has felt baby move.     New concerns today:   No bleeding or discharge, no recent illness.  No pain    Obstetric History       T1      L1     SAB0   TAB0   Ectopic0   Multiple0   Live Births1       # Outcome Date GA Lbr David/2nd Weight Sex Delivery Anes PTL Lv   2 Current            1 Term 12   6 lb 14 oz (3.118 kg) F Vag-Spont   ADRIENNE          Physical exam:  /77  Pulse 80  Temp 97.7  F (36.5  C)  Resp 20  Ht 5' 3\" (160 cm)  Wt 169 lb 12.8 oz (77 kg)  LMP 2017  SpO2 100%  BMI 30.08 kg/m2    General: alert, female in no acute distress  Abdomen: gravid uterus appropriate for gestation age at 24 cm above pubic symphysis, non-tender, FHTs 140s  Extremities: no edema    Assessment/Plan:  Patient Active Problem List   Diagnosis     Normal pregnancy, second trimester     Next visit: GTT, TDAP, Hgb    Weight gain appropriate  Information given on gestational diabetes. 1 hour glucose tolerance test today.   Blood type O+ . Rhogam not indicated.  Discussed signs and symptoms of  labor.     Follow up in 4 weeks for routine prenatal visit.     Zulma Pabon MD          "

## 2017-11-10 ENCOUNTER — OFFICE VISIT (OUTPATIENT)
Dept: FAMILY MEDICINE | Facility: CLINIC | Age: 22
End: 2017-11-10

## 2017-11-10 VITALS
BODY MASS INDEX: 30.83 KG/M2 | DIASTOLIC BLOOD PRESSURE: 81 MMHG | SYSTOLIC BLOOD PRESSURE: 117 MMHG | HEIGHT: 63 IN | WEIGHT: 174 LBS | TEMPERATURE: 97.6 F | HEART RATE: 73 BPM

## 2017-11-10 DIAGNOSIS — Z23 IMMUNIZATION DUE: ICD-10-CM

## 2017-11-10 DIAGNOSIS — Z34.93 NORMAL PREGNANCY, THIRD TRIMESTER: ICD-10-CM

## 2017-11-10 DIAGNOSIS — Z13.9 SCREENING FOR CONDITION: Primary | ICD-10-CM

## 2017-11-10 LAB
GLU GEST SCREEN 1HR 50G: 78 (ref 0–129)
HEMOGLOBIN: 12.2 G/DL (ref 11.7–15.7)

## 2017-11-10 NOTE — PROGRESS NOTES
"Sarah Will is a 22 year old  female who presents to clinic for a follow up OB visit. She is currently 28w1d with an estimated date of delivery of 2018 via LMP. She denies headache, chest pain, shortness of breath, abdominal pain/contractions, vaginal bleeding, or abnormal discharge. She has felt baby move.     New concerns today: none    Obstetric History       T1      L1     SAB0   TAB0   Ectopic0   Multiple0   Live Births1       # Outcome Date GA Lbr David/2nd Weight Sex Delivery Anes PTL Lv   2 Current            1 Term 12   6 lb 14 oz (3.118 kg) F Vag-Spont   ADRIENNE          Physical exam:  /81  Pulse 73  Temp 97.6  F (36.4  C) (Oral)  Ht 5' 2.8\" (159.5 cm)  Wt 174 lb (78.9 kg)  LMP 2017  BMI 31.02 kg/m2    General: alert, female in no acute distress  Abdomen: gravid uterus appropriate for gestation age at  cm above pubic symphysis, non-tender, FHTs 130s  Extremities: no edema    Results for orders placed or performed in visit on 11/10/17   Glucose Challenge  1 Hr  ([facility])   Result Value Ref Range    Glu Gest Screen 1hr 50g 78 0 - 129   Syphilis Screen [place] - RPR ([facility])    Result Value Ref Range    Syphilis Screen [place] Non-Reactive Non-Reactive    Narrative    Test performed by:  [facility] [address] [place] [address]   Hemoglobin (HGB) ([facility])   Result Value Ref Range    Hemoglobin 12.2 11.7 - 15.7 g/dL         Assessment/Plan:  Patient Active Problem List   Diagnosis     Normal pregnancy, second trimester       CBC and TDAP immunization today.  Information given on gestational diabetes. 1 hour glucose tolerance test today.   Blood type O+. Rhogam not indicated.  Discussed signs and symptoms of  labor.     Follow up in 4 weeks for routine prenatal visit.     Zulma Pabon MD          "

## 2017-11-10 NOTE — LETTER
November 11, 2017      Sarah Will  [address] [facility] [address]        Dear Sarah,    Please see below for your test results.  You passed your glucose challenge and your syphilis screen was normal.      Resulted Orders   Glucose Challenge  1 Hr  ([facility])   Result Value Ref Range    Glu Gest Screen 1hr 50g 78 0 - 129   Syphilis Screen [place] - RPR ([facility])    Result Value Ref Range    Syphilis Screen [place] Non-Reactive Non-Reactive    Narrative    Test performed by:  [facility] [address] [place] [address]   Hemoglobin (HGB) ([facility])   Result Value Ref Range    Hemoglobin 12.2 11.7 - 15.7 g/dL       If you have any questions, please call the clinic to make an appointment.    Sincerely,    Zulma Pabon MD

## 2017-11-10 NOTE — MR AVS SNAPSHOT
After Visit Summary   11/10/2017    Sarah Will    MRN: [id number]           Patient Information     Date Of Birth          1995        Visit Information        Provider Department      11/10/2017 4:00 PM Zulma Pabon MD [facility]        Today's Diagnoses     Screening for condition    -  1    Normal pregnancy, third trimester        Immunization due           Follow-ups after your visit        Your next 10 appointments already scheduled     Dec 12, 2017  4:20 PM CST   RETURN OB with Zulma Pabon MD   [facility] ([facility])    [address] [place] [address]   [phone]              Who to contact     Please call your clinic at [phone] to:    Ask questions about your health    Make or cancel appointments    Discuss your medicines    Learn about your test results    Speak to your doctor   If you have compliments or concerns about an experience at your clinic, or if you wish to file a complaint, please contact [facility] Patient Relations at [phone] or email us at [email or web address]         Additional Information About Your Visit        MyChart Information     Elyssafregori is an electronic gateway that provides easy, online access to your medical records. With Elyssafregori, you can request a clinic appointment, read your test results, renew a prescription or communicate with your care team.     To sign up for Elyssafregori visit the website at [email or web address]   You will be asked to enter the access code listed below, as well as some personal information. Please follow the directions to create your username and password.     Your access code is: [email or web address]  Expires: 2017 10:27 AM     Your access code will  in 90 days. If you need help or a new code, please contact your [facility] or call [phone] for assistance.        Care EveryWhere ID   "   This is your Care EveryWhere ID. This could be used by other organizations to access your [facility] medical records  [id number]        Your Vitals Were     Pulse Temperature Height Last Period BMI (Body Mass Index)       73 97.6  F (36.4  C) (Oral) 5' 2.8\" (159.5 cm) 04/27/2017 31.02 kg/m2        Blood Pressure from Last 3 Encounters:   11/10/17 117/81   10/16/17 115/77   09/15/17 122/77    Weight from Last 3 Encounters:   11/10/17 174 lb (78.9 kg)   10/16/17 169 lb 12.8 oz (77 kg)   09/15/17 164 lb 4 oz (74.5 kg)              We Performed the Following     ADMIN VACCINE, INITIAL     Glucose Challenge  1 Hr  ([facility])     Hemoglobin (HGB) ([facility])     Syphilis Screen [place] - RPR ([facility])      TDAP VACCINE (BOOSTRIX)        Primary Care Provider Office Phone # Fax #    Zulma Nalini Pabon [phone]       [facility] [address] [place] [address]        Equal Access to Services     [facility]: Hadii aad ku hadasho Soomaali, waaxda luqadaha, qaybta kaalmada adejordanyada, shabbir roberts . So [facility] [phone].    ATENCIÓN: Si habla español, tiene a cox disposición servicios gratuitos de asistencia lingüística. [facility] [phone].    We comply with applicable federal civil rights laws and [place] laws. We do not discriminate on the basis of race, color, national origin, age, disability, sex, sexual orientation, or gender identity.            Thank you!     Thank you for choosing [facility]  for your care. Our goal is always to provide you with excellent care. Hearing back from our patients is one way we can continue to improve our services. Please take a few minutes to complete the written survey that you may receive in the mail after your visit with us. Thank you!             Your Updated Medication List - Protect others around you: Learn how to safely use, store and throw away your medicines at www.disposemymeds.org.      Notice  As " of 11/10/2017  4:48 PM    You have not been prescribed any medications.

## 2017-11-11 LAB — RPR SER QL: NORMAL

## 2017-12-12 ENCOUNTER — OFFICE VISIT (OUTPATIENT)
Dept: FAMILY MEDICINE | Facility: CLINIC | Age: 22
End: 2017-12-12

## 2017-12-12 VITALS
BODY MASS INDEX: 32.04 KG/M2 | SYSTOLIC BLOOD PRESSURE: 115 MMHG | TEMPERATURE: 98 F | DIASTOLIC BLOOD PRESSURE: 75 MMHG | OXYGEN SATURATION: 98 % | HEIGHT: 63 IN | RESPIRATION RATE: 18 BRPM | HEART RATE: 91 BPM | WEIGHT: 180.8 LBS

## 2017-12-12 DIAGNOSIS — Z34.93 NORMAL PREGNANCY, THIRD TRIMESTER: Primary | ICD-10-CM

## 2017-12-12 NOTE — PROGRESS NOTES
"Sarah Will is a 22 year old  female who presents to clinic for a follow up OB visit. She is currently 32w5d with an estimated date of delivery of 2018 via LMP. She denies headache, chest pain, shortness of breath, abdominal pain/contractions, vaginal bleeding, or abnormal discharge. She has felt baby move.     New concerns today: none    Obstetric History       T1      L1     SAB0   TAB0   Ectopic0   Multiple0   Live Births1       # Outcome Date GA Lbr David/2nd Weight Sex Delivery Anes PTL Lv   2 Current            1 Term 12   6 lb 14 oz (3.118 kg) F Vag-Spont   ADRIENNE          Physical exam:  /75  Pulse 91  Temp 98  F (36.7  C)  Resp 18  Ht 5' 2.8\" (159.5 cm)  Wt 180 lb 12.8 oz (82 kg)  LMP 2017  SpO2 98%  BMI 32.23 kg/m2    General: alert, female in no acute distress  CV: RRR w/o M/R/G  PULM: CTAB  Abdomen: gravid uterus appropriate for gestation age at  cm above pubic symphysis, non-tender, FHTs 130s  Extremities: no edema  Reflexes: 2+ (patient is unsure if she has hx active reflexes or not)    Assessment/Plan:  Patient Active Problem List   Diagnosis     Normal pregnancy, second trimester       Post-partum contraception plans: none currently  Will see Dr Fu in 2 weeks  Pt Rh+  Rubella immune  Passed 1hr GTT  2nd sypillis neg  Tdap 11/10/17      Follow up in 2 weeks for routine prenatal visit.     Zulma Pabon MD            "

## 2017-12-22 PROBLEM — Z34.93 NORMAL PREGNANCY, THIRD TRIMESTER: Status: ACTIVE | Noted: 2017-08-27

## 2017-12-27 ENCOUNTER — OFFICE VISIT (OUTPATIENT)
Dept: FAMILY MEDICINE | Facility: CLINIC | Age: 22
End: 2017-12-27

## 2017-12-27 VITALS
RESPIRATION RATE: 20 BRPM | HEIGHT: 63 IN | SYSTOLIC BLOOD PRESSURE: 118 MMHG | OXYGEN SATURATION: 100 % | TEMPERATURE: 97.6 F | HEART RATE: 81 BPM | WEIGHT: 184 LBS | DIASTOLIC BLOOD PRESSURE: 77 MMHG | BODY MASS INDEX: 32.6 KG/M2

## 2017-12-27 DIAGNOSIS — Z34.93 NORMAL PREGNANCY, THIRD TRIMESTER: Primary | ICD-10-CM

## 2017-12-27 NOTE — MR AVS SNAPSHOT
After Visit Summary   12/27/2017    Sarah Will    MRN: [id number]           Patient Information     Date Of Birth          1995        Visit Information        Provider Department      12/27/2017 3:40 PM Bonnie Fu MD [facility]        Care Instructions    [facility] Information  If you have any further concerns or wish to schedule another appointment, please call our office at [phone] during normal business hours (8-5, M-F).     For uncomplicated pregnancies, you will be seeing your doctor once a month until you are 28 weeks, then you will see your doctor twice a month. You will begin weekly visits at 36 weeks until you deliver.     If you have urgent medical questions that cannot wait, you may call [phone] at any time of day.     If you have a medical emergency, please call [phone].    When to call or come in to the hospital  If you notice a decrease in your baby's movement.   If your begin to experience contractions that are 5 minutes or less apart and lasting for over 45 seconds, or if contractions are becoming more painful.  If you have any bleeding or leakage of fluids.   If you have a headache not resolved with tylenol, right upper abdominal pain, or sudden onset of swelling.  You know your body best. Never hesitate to call or go to the hospital if something doesn't feel right!    Preparing for the hospital  You re likely feeling anxious as your child s birth approaches. This is normal. To give yourself some peace of mind, pack a bag 3-4 weeks before your due date. Here is a list of things to remember:  Personal care items like toothbrush, hair brush, lip balm, lotion, shampoo, glasses, contacts  Nightgown, bathrobe, slippers  Several pairs of underwear  Comfortable clothes for you to wear home  Camera with new batteries  Cell phone and   Insurance information and any other paperwork needed for your hospital stay  Clothes for baby to wear  home  An infant, rear-facing car seat for bringing home your baby (this is required by law)  Managing Labor Pain   There are many ways to manage pain during labor. It can often be done with no anesthesia or strong pain medications. Talk to your health care provider about any options you would like to explore.   Help from Relaxation  Some of these are learned in special classes. Your health care provider can help you find classes. The hospital has a tub you can use during early labor. These methods may be of help to you:   Breathing techniques   A warm tub between contractions   Massage and therapeutic touch by your support person or labor    Reading materials that are comforting or inspiring   Music that is soothing   Hypnosis   Acupuncture and acupressure   Heat and cold applicatio  Help From Analgesics   Analgesics are mild medications that reduce pain. They can be used along with some relaxation methods. They can give you pain relief without total loss of feeling. They may lessen the pain of strong contractions. You may feel well enough to nap between contractions. They have little effect on your baby if given early in labor. This may be done by injection or by IV.   Help From Anesthetics   Anesthesia involves blockage of all feeling including pain. It can be given in the form of local anesthesia or general anesthesia.  Anesthesia is a type of medication to prevent pain. It is often used in labor. It may numb only one region of your body. This is called regional anesthesia. Or it may let you sleep during surgery. This is called general anesthesia. This type of medication is given by a trained specialist. When possible, regional anesthesia will be used. This is so you can be awake during your baby s birth.   Regional Anesthesia   Regional anesthesia may be used to numb your lower body for a vaginal or  birth. It does not go into your bloodstream. This means that little or none of it will reach your  baby. There are two kinds:   Epidural. This is most often given while you sit up or lie on your side. A needle with a flexible tube (catheter) is put in your lower back. The needle is then removed. The anesthetic is sent through the catheter. A pump may be attached. This gives you a constant level of anesthetic. An epidural often only partly affects muscle control. This means you should still be able to push for a vaginal birth.   Spinal. This is most often given in one dose right before delivery. It acts fast. You may sit up or lie down when it is injected. It may affect muscle control in your lower body. This includes the ability to push.    General Anesthesia   General anesthesia lets you sleep and keeps you free of pain during surgery. It may be used for a . It may be given as an injection. It may be given as an inhaled gas. Or it may be given as both. Delivery often occurs before the medication has reached the baby.            Follow-ups after your visit        Your next 10 appointments already scheduled     2018  8:40 AM CST   RETURN OB with Zulma Pabon MD   [facility] ([facility])    [address] [place] [address]   [phone]              Who to contact     Please call your clinic at [phone] to:    Ask questions about your health    Make or cancel appointments    Discuss your medicines    Learn about your test results    Speak to your doctor   If you have compliments or concerns about an experience at your clinic, or if you wish to file a complaint, please contact [facility] Patient Relations at [phone] or email us at [email or web address]         Additional Information About Your Visit        Nexihart Information     Trivop is an electronic gateway that provides easy, online access to your medical records. With Trivop, you can request a clinic appointment, read your test results, renew a prescription  "or communicate with your care team.     To sign up for Guideslyhart visit the website at [email or web address]   You will be asked to enter the access code listed below, as well as some personal information. Please follow the directions to create your username and password.     Your access code is: [email or web address]  Expires: 3/22/2018  9:00 AM     Your access code will  in 90 days. If you need help or a new code, please contact your [facility] or call [phone] for assistance.        Care EveryWhere ID     This is your Care EveryWhere ID. This could be used by other organizations to access your [facility] medical records  [id number]        Your Vitals Were     Pulse Temperature Respirations Height Last Period Pulse Oximetry    81 97.6  F (36.4  C) (Oral) 20 5' 3\" (160 cm) 2017 100%    BMI (Body Mass Index)                   32.59 kg/m2            Blood Pressure from Last 3 Encounters:   17 118/77   17 115/75   11/10/17 117/81    Weight from Last 3 Encounters:   17 184 lb (83.5 kg)   17 180 lb 12.8 oz (82 kg)   11/10/17 174 lb (78.9 kg)              Today, you had the following     No orders found for display       Primary Care Provider Office Phone # Fax #    Zulma Nalini Pabon [phone]       [facility] [address] [place] [address]        Equal Access to Services     IFEANYI DEAL AH: Hadii aad ku hadasho Soomaali, waaxda luqadaha, qaybta kaalmada adeegyada, shabbir lares. So [facility] [phone].    ATENCIÓN: Si habla español, tiene a cox disposición servicios gratuitos de asistencia lingüística. Llame al [phone].    We comply with applicable federal civil rights laws and [place] laws. We do not discriminate on the basis of race, color, national origin, age, disability, sex, sexual orientation, or gender identity.            Thank you!     Thank you for choosing [facility]  for your care. Our goal is always to provide you with excellent care. Hearing back from our patients is one way we can continue to improve our services. Please take a few minutes to complete the written survey that you may receive in the mail after your visit with us. Thank you!             Your Updated Medication List - Protect others around you: Learn how to safely use, store and throw away your medicines at www.disposemymeds.org.      Notice  As of 12/27/2017  4:08 PM    You have not been prescribed any medications.

## 2017-12-27 NOTE — PROGRESS NOTES
"Sarah Will is a 22 year old  female who presents to clinic for a follow up OB visit. She is currently 34w6d with an estimated date of delivery of 2018 via LMP. She denies headache, chest pain, shortness of breath, abdominal pain/contractions, vaginal bleeding, or abnormal discharge. She has felt baby move.     New concerns today:   -occasional mild low back pain: not significant, states still able to do normal activities, no numbness/tingling - discussed using ice/heat, massage and tylenol PRN    Obstetric History       T1      L1     SAB0   TAB0   Ectopic0   Multiple0   Live Births1       # Outcome Date GA Lbr David/2nd Weight Sex Delivery Anes PTL Lv   2 Current            1 Term 12   6 lb 14 oz (3.118 kg) F Vag-Spont   ADRIENNE          Physical exam:  /77  Pulse 81  Temp 97.6  F (36.4  C) (Oral)  Resp 20  Ht 5' 3\" (160 cm)  Wt 184 lb (83.5 kg)  LMP 2017  SpO2 100%  BMI 32.59 kg/m2    General: alert, female in no acute distress  Abdomen: gravid uterus appropriate for gestation age at 33 cm above pubic symphysis, non-tender, FHTs 145  Extremities: no edema    Assessment/Plan:  Patient Active Problem List   Diagnosis     Normal pregnancy, third trimester       O pos  Passed 1 hr GCT  2nd tri syphilis negative  Rubella immune  Tdap given 11/10/17    Follow up in 2 weeks for routine prenatal care - scheduled on 18 with PCP Dr Pabon, GBS swab to be obtained that day.    Bonnie Fu MD  [facility] Family Medicine Resident    Precepted with: Dr Nixon      "

## 2017-12-27 NOTE — PATIENT INSTRUCTIONS
[facility] Information  If you have any further concerns or wish to schedule another appointment, please call our office at [phone] during normal business hours (8-5, M-F).     For uncomplicated pregnancies, you will be seeing your doctor once a month until you are 28 weeks, then you will see your doctor twice a month. You will begin weekly visits at 36 weeks until you deliver.     If you have urgent medical questions that cannot wait, you may call [phone] at any time of day.     If you have a medical emergency, please call [phone].    When to call or come in to the hospital  If you notice a decrease in your baby's movement.   If your begin to experience contractions that are 5 minutes or less apart and lasting for over 45 seconds, or if contractions are becoming more painful.  If you have any bleeding or leakage of fluids.   If you have a headache not resolved with tylenol, right upper abdominal pain, or sudden onset of swelling.  You know your body best. Never hesitate to call or go to the hospital if something doesn't feel right!    Preparing for the hospital  You re likely feeling anxious as your child s birth approaches. This is normal. To give yourself some peace of mind, pack a bag 3-4 weeks before your due date. Here is a list of things to remember:  Personal care items like toothbrush, hair brush, lip balm, lotion, shampoo, glasses, contacts  Nightgown, bathrobe, slippers  Several pairs of underwear  Comfortable clothes for you to wear home  Camera with new batteries  Cell phone and   Insurance information and any other paperwork needed for your hospital stay  Clothes for baby to wear home  An infant, rear-facing car seat for bringing home your baby (this is required by law)  Managing Labor Pain   There are many ways to manage pain during labor. It can often be done with no anesthesia or strong pain medications. Talk to your health care provider about any options you would like to  explore.   Help from Relaxation  Some of these are learned in special classes. Your health care provider can help you find classes. The hospital has a tub you can use during early labor. These methods may be of help to you:   Breathing techniques   A warm tub between contractions   Massage and therapeutic touch by your support person or labor    Reading materials that are comforting or inspiring   Music that is soothing   Hypnosis   Acupuncture and acupressure   Heat and cold applicatio  Help From Analgesics   Analgesics are mild medications that reduce pain. They can be used along with some relaxation methods. They can give you pain relief without total loss of feeling. They may lessen the pain of strong contractions. You may feel well enough to nap between contractions. They have little effect on your baby if given early in labor. This may be done by injection or by IV.   Help From Anesthetics   Anesthesia involves blockage of all feeling including pain. It can be given in the form of local anesthesia or general anesthesia.  Anesthesia is a type of medication to prevent pain. It is often used in labor. It may numb only one region of your body. This is called regional anesthesia. Or it may let you sleep during surgery. This is called general anesthesia. This type of medication is given by a trained specialist. When possible, regional anesthesia will be used. This is so you can be awake during your baby s birth.   Regional Anesthesia   Regional anesthesia may be used to numb your lower body for a vaginal or  birth. It does not go into your bloodstream. This means that little or none of it will reach your baby. There are two kinds:   Epidural. This is most often given while you sit up or lie on your side. A needle with a flexible tube (catheter) is put in your lower back. The needle is then removed. The anesthetic is sent through the catheter. A pump may be attached. This gives you a constant level of  anesthetic. An epidural often only partly affects muscle control. This means you should still be able to push for a vaginal birth.   Spinal. This is most often given in one dose right before delivery. It acts fast. You may sit up or lie down when it is injected. It may affect muscle control in your lower body. This includes the ability to push.    General Anesthesia   General anesthesia lets you sleep and keeps you free of pain during surgery. It may be used for a . It may be given as an injection. It may be given as an inhaled gas. Or it may be given as both. Delivery often occurs before the medication has reached the baby.

## 2017-12-27 NOTE — PROGRESS NOTES
Preceptor Attestation:  Patient's case reviewed and discussed with Bonnie Fu MD resident and I evaluated the patient. I agree with written assessment and plan of care.  Supervising Physician:Julien Nixon MD    [facility]

## 2018-01-09 ENCOUNTER — OFFICE VISIT (OUTPATIENT)
Dept: FAMILY MEDICINE | Facility: CLINIC | Age: 23
End: 2018-01-09
Payer: COMMERCIAL

## 2018-01-09 VITALS
OXYGEN SATURATION: 98 % | HEART RATE: 92 BPM | DIASTOLIC BLOOD PRESSURE: 77 MMHG | SYSTOLIC BLOOD PRESSURE: 120 MMHG | RESPIRATION RATE: 20 BRPM | WEIGHT: 189.2 LBS | HEIGHT: 64 IN | TEMPERATURE: 97.9 F | BODY MASS INDEX: 32.3 KG/M2

## 2018-01-09 DIAGNOSIS — Z34.93 NORMAL PREGNANCY, THIRD TRIMESTER: Primary | ICD-10-CM

## 2018-01-09 NOTE — LETTER
January 11, 2018      Sarah Will  [address] [address]        Dear Sarah,    Please see below for your test results.  Coy GBS is negative    Resulted Orders   Clt. Grp B Strp Screen ([employer])   Result Value Ref Range    Group B Strep Antigen Negative Negative    Allergic To Penicillin No     Narrative    Test performed by:  [facility] [address] [place] [address]  Intended Use:  The illumigene Group B Streptococcus (GBS) DNA amplification assay is a   qualitative in-vitro diagnostic for the detection of Streptococcus agalactiae   in enriched cultures obtained from vaginal/rectal swabs from antepartum women.    Results from this assay can be used as an aide in establishing the GBS   colonization status of antepartum women.  This assay does not diagnose or   monitor treatment for GBS infections.  The illumigene GBS assay is intended   for use in hospital, reference or state laboratory settings.  The device is   not intended for point-of-care use.  Methodology:  The illumigene GBS molecular assay is based on loop mediated amplification   technology, which uses specifically designed primers to Streptococcus   agalactiae to provide for specific and continuous isothermal DNA   amplification.  A by-product of this amplification is magnesium pyrophosphate,   which forms a white precipitate leading to a turbid reaction solution.    Change in sample absorbance indicates the presence of GBS and is reported as   positive.  The absence of target DNA results is no detectable change in   absorbance and is reported as negative.  The illumigene GBS assay specifically   targets a highly conserved 213 base pair sequence of the Streptococcus   agalactiae genome.       If you have any questions, please call the clinic to make an appointment.    Sincerely,    Zulma Pabon MD

## 2018-01-09 NOTE — MR AVS SNAPSHOT
After Visit Summary   2018    Sarah Will    MRN: [id number]           Patient Information     Date Of Birth          1995        Visit Information        Provider Department      2018 8:40 AM Zulma Pabon MD [facility]        Today's Diagnoses     Normal pregnancy, third trimester    -  1       Follow-ups after your visit        Your next 10 appointments already scheduled     2018  2:00 PM CST   RETURN OB with Zulma Pabon MD   [facility] ([facility])    [address] [place] [address]   [phone]              Who to contact     Please call your clinic at [phone] to:    Ask questions about your health    Make or cancel appointments    Discuss your medicines    Learn about your test results    Speak to your doctor   If you have compliments or concerns about an experience at your clinic, or if you wish to file a complaint, please contact [facility] Patient Relations at [phone] or email us at [email or web address]         Additional Information About Your Visit        MyChart Information     Pimovation is an electronic gateway that provides easy, online access to your medical records. With Pimovation, you can request a clinic appointment, read your test results, renew a prescription or communicate with your care team.     To sign up for Pimovation visit the website at [email or web address]   You will be asked to enter the access code listed below, as well as some personal information. Please follow the directions to create your username and password.     Your access code is: [email or web address]  Expires: 3/22/2018  9:00 AM     Your access code will  in 90 days. If you need help or a new code, please contact your [facility] or call [phone] for assistance.        Care EveryWhere ID     This is your Care EveryWhere ID. This could be used by  "other organizations to access your [place] medical records  [id number]        Your Vitals Were     Pulse Temperature Respirations Height Last Period Pulse Oximetry    92 97.9  F (36.6  C) (Oral) 20 5' 3.5\" (161.3 cm) 04/27/2017 98%    BMI (Body Mass Index)                   32.99 kg/m2            Blood Pressure from Last 3 Encounters:   01/09/18 120/77   12/27/17 118/77   12/12/17 115/75    Weight from Last 3 Encounters:   01/09/18 189 lb 3.2 oz (85.8 kg)   12/27/17 184 lb (83.5 kg)   12/12/17 180 lb 12.8 oz (82 kg)              We Performed the Following     Clt. Grp B Strp Screen ([employer])        Primary Care Provider Office Phone # Fax #    Zulma Nalini Pabon [phone]       [facility] [address] [place] [address]        Equal Access to Services     IFEANYI [facility] AH: Hadii aad ku hadasho Soomaali, waaxda luqadaha, qaybta kaalmada adeegyada, waxay idiin hayaan adeeg kharash la'aan . So [facility] [phone].    ATENCIÓN: Si habla español, tiene a cox disposición servicios gratuitos de asistencia lingüística. Llame al [phone].    We comply with applicable federal civil rights laws and [place] laws. We do not discriminate on the basis of race, color, national origin, age, disability, sex, sexual orientation, or gender identity.            Thank you!     Thank you for choosing [facility]  for your care. Our goal is always to provide you with excellent care. Hearing back from our patients is one way we can continue to improve our services. Please take a few minutes to complete the written survey that you may receive in the mail after your visit with us. Thank you!             Your Updated Medication List - Protect others around you: Learn how to safely use, store and throw away your medicines at www.disposemymeds.org.      Notice  As of 1/9/2018  9:37 AM    You have not been prescribed any medications.      "

## 2018-01-09 NOTE — PROGRESS NOTES
"Sarah Will is a 22 year old  female who presents to clinic for a follow up OB visit. She is currently 36w5d with an estimated date of delivery of 2018 via vaginal delivery. She denies headache, chest pain, shortness of breath, abdominal pain/contractions, vaginal bleeding, or abnormal discharge. She has felt baby move.     New concerns today: Notices some vaginal pressure. No Franky-Gorman   Vaginal pressure although no back pain or contractions.   No HA or vision changes.   No vaginal bleeding. Discharge as normal for her.  [place] for delivery.     Obstetric History       T1      L1     SAB0   TAB0   Ectopic0   Multiple0   Live Births1       # Outcome Date GA Lbr David/2nd Weight Sex Delivery Anes PTL Lv   2 Current            1 Term 12   6 lb 14 oz (3.118 kg) F Vag-Spont   ADRIENNE          Physical exam:  /77  Pulse 92  Temp 97.9  F (36.6  C) (Oral)  Resp 20  Ht 5' 3.5\" (161.3 cm)  Wt 189 lb 3.2 oz (85.8 kg)  LMP 2017  SpO2 98%  BMI 32.99 kg/m2    General: alert, female in no acute distress  Abdomen: gravid uterus appropriate for gestation age at 36 cm above pubic symphysis, non-tender, FHTs 140-150s, Leopold's maneuver reveals cephalic positioning  Gyn: minimal discharge.  Posterior, -2, closed, cervix soft  Extremities: no edema  Reflexes: normal at 1+, no clonus    Assessment/Plan:  Patient Active Problem List   Diagnosis     Normal pregnancy, third trimester       Reviewed expectations for final month of pregnancy and when to call/come in.  Group B strep discussed and culture collected.       Follow up in 1 week for routine prenatal visit.     Zulma Pabon MD          "

## 2018-01-10 LAB
ALLERGIC TO PENICILLIN: NO
GP B STREP AG SPEC QL LA: NEGATIVE

## 2018-01-16 ENCOUNTER — OFFICE VISIT (OUTPATIENT)
Dept: FAMILY MEDICINE | Facility: CLINIC | Age: 23
End: 2018-01-16
Payer: COMMERCIAL

## 2018-01-16 VITALS
WEIGHT: 192.4 LBS | TEMPERATURE: 97.5 F | SYSTOLIC BLOOD PRESSURE: 111 MMHG | DIASTOLIC BLOOD PRESSURE: 73 MMHG | RESPIRATION RATE: 22 BRPM | HEIGHT: 64 IN | BODY MASS INDEX: 32.85 KG/M2 | OXYGEN SATURATION: 99 % | HEART RATE: 90 BPM

## 2018-01-16 DIAGNOSIS — Z34.93 NORMAL PREGNANCY, THIRD TRIMESTER: Primary | ICD-10-CM

## 2018-01-16 NOTE — MR AVS SNAPSHOT
After Visit Summary   2018    Sarah Will    MRN: [id number]           Patient Information     Date Of Birth          1995        Visit Information        Provider Department      2018 2:00 PM Zulma Pabon MD [facility]        Today's Diagnoses     Normal pregnancy, third trimester    -  1       Follow-ups after your visit        Your next 10 appointments already scheduled     2018  8:00 AM CST   RETURN OB with Zulma Pabon MD   [facility] ([facility])    [address] [place] [id number]   [phone]              Who to contact     Please call your clinic at [phone] to:    Ask questions about your health    Make or cancel appointments    Discuss your medicines    Learn about your test results    Speak to your doctor   If you have compliments or concerns about an experience at your clinic, or if you wish to file a complaint, please contact [facility] Patient Relations at [phone] or email us at [email or web address]         Additional Information About Your Visit        MyChart Information     SocialVolt is an electronic gateway that provides easy, online access to your medical records. With SocialVolt, you can request a clinic appointment, read your test results, renew a prescription or communicate with your care team.     To sign up for SocialVolt visit the website at [email or web address]   You will be asked to enter the access code listed below, as well as some personal information. Please follow the directions to create your username and password.     Your access code is: [email or web address]  Expires: 3/22/2018  9:00 AM     Your access code will  in 90 days. If you need help or a new code, please contact your [facility] or call [phone] for assistance.        Care EveryWhere ID     This is your Care EveryWhere ID. This could be used by  "other organizations to access your [place] medical records  [id number]        Your Vitals Were     Pulse Temperature Respirations Height Last Period Pulse Oximetry    90 97.5  F (36.4  C) 22 5' 3.5\" (161.3 cm) 04/27/2017 99%    BMI (Body Mass Index)                   33.55 kg/m2            Blood Pressure from Last 3 Encounters:   01/16/18 111/73   01/09/18 120/77   12/27/17 118/77    Weight from Last 3 Encounters:   01/16/18 192 lb 6.4 oz (87.3 kg)   01/09/18 189 lb 3.2 oz (85.8 kg)   12/27/17 184 lb (83.5 kg)              Today, you had the following     No orders found for display       Primary Care Provider Office Phone # Fax #    Zulma Pabon [phone]       [facility] [address] [place] [address]        Equal Access to Services     [facility] : Hadii aad ku hadasho Soomaali, waaxda luqadaha, qaybta kaalmada adeegyada, waxay idiin hayaan kris roberts . So [facility] [phone].    ATENCIÓN: Si habla español, tiene a cox disposición servicios gratuitos de asistencia lingüística. Llame al [phone].    We comply with applicable federal civil rights laws and [place] laws. We do not discriminate on the basis of race, color, national origin, age, disability, sex, sexual orientation, or gender identity.            Thank you!     Thank you for choosing [facility]  for your care. Our goal is always to provide you with excellent care. Hearing back from our patients is one way we can continue to improve our services. Please take a few minutes to complete the written survey that you may receive in the mail after your visit with us. Thank you!             Your Updated Medication List - Protect others around you: Learn how to safely use, store and throw away your medicines at www.disposemymeds.org.      Notice  As of 1/16/2018  3:26 PM    You have not been prescribed any medications.      "

## 2018-01-16 NOTE — PROGRESS NOTES
"Sarah Will is a 22 year old  female who presents to clinic for a follow up OB visit. She is currently 37w5d with an estimated date of delivery of 2018 via early US. She denies headache, chest pain, shortness of breath, abdominal pain/contractions, vaginal bleeding, or abnormal discharge. She has felt baby move.     New concerns today:   Tiffanie pain in anterior abdomen with ambulation that resolves with rest/sitting.    Some back pain    Obstetric History       T1      L1     SAB0   TAB0   Ectopic0   Multiple0   Live Births1       # Outcome Date GA Lbr David/2nd Weight Sex Delivery Anes PTL Lv   2 Current            1 Term 12   6 lb 14 oz (3.118 kg) F Vag-Spont   ADRIENNE          Physical exam:  /73  Pulse 90  Temp 97.5  F (36.4  C)  Resp 22  Ht 5' 3.5\" (161.3 cm)  Wt 192 lb 6.4 oz (87.3 kg)  LMP 2017  SpO2 99%  BMI 33.55 kg/m2    General: alert, female in no acute distress  Abdomen: gravid uterus appropriate for gestation age at 38 cm above pubic symphysis, non-tender, FHTs 110-130s, Leopold's maneuver reveals cephalic positioning  Gyn: 0cm/20%/-2, no discharge  Extremities: no edema    Assessment/Plan:  Patient Active Problem List   Diagnosis     Normal pregnancy, third trimester       Reviewed expectations for final month of pregnancy and when to call/come in.  Group B strep discussed.  Negative    Follow up in 1 week for routine prenatal visit.     Zulma Pabon MD          "

## 2018-01-24 ENCOUNTER — OFFICE VISIT (OUTPATIENT)
Dept: FAMILY MEDICINE | Facility: CLINIC | Age: 23
End: 2018-01-24
Payer: COMMERCIAL

## 2018-01-24 VITALS
RESPIRATION RATE: 20 BRPM | DIASTOLIC BLOOD PRESSURE: 72 MMHG | HEIGHT: 64 IN | BODY MASS INDEX: 32.03 KG/M2 | HEART RATE: 77 BPM | SYSTOLIC BLOOD PRESSURE: 109 MMHG | TEMPERATURE: 97.4 F | OXYGEN SATURATION: 99 % | WEIGHT: 187.6 LBS

## 2018-01-24 DIAGNOSIS — Z34.93 NORMAL PREGNANCY, THIRD TRIMESTER: Primary | ICD-10-CM

## 2018-01-24 NOTE — MR AVS SNAPSHOT
After Visit Summary   2018    Sarah Will    MRN: [id number]           Patient Information     Date Of Birth          1995        Visit Information        Provider Department      2018 8:00 AM Zulma Pabon MD [facility]        Today's Diagnoses     Normal pregnancy, third trimester    -  1       Follow-ups after your visit        Your next 10 appointments already scheduled     2018  9:20 AM CST   RETURN OB with Zulma Pabon MD   [facility] ([facility])    [address] [place] [address]   [phone]              Who to contact     Please call your clinic at [phone] to:    Ask questions about your health    Make or cancel appointments    Discuss your medicines    Learn about your test results    Speak to your doctor   If you have compliments or concerns about an experience at your clinic, or if you wish to file a complaint, please contact [facility] Patient Relations at [phone] or email us at [email or web address]         Additional Information About Your Visit        MyChart Information     E Ink is an electronic gateway that provides easy, online access to your medical records. With E Ink, you can request a clinic appointment, read your test results, renew a prescription or communicate with your care team.     To sign up for E Ink visit the website at [email or web address]   You will be asked to enter the access code listed below, as well as some personal information. Please follow the directions to create your username and password.     Your access code is: [email or web address]  Expires: 3/22/2018  9:00 AM     Your access code will  in 90 days. If you need help or a new code, please contact your [facility] or call [phone] for assistance.        Care EveryWhere ID     This is your Care EveryWhere ID. This could be used by  "other organizations to access your [place] medical records  [id number]        Your Vitals Were     Pulse Temperature Respirations Height Last Period Pulse Oximetry    77 97.4  F (36.3  C) (Oral) 20 5' 3.5\" (161.3 cm) 04/27/2017 99%    BMI (Body Mass Index)                   32.71 kg/m2            Blood Pressure from Last 3 Encounters:   01/30/18 106/70   01/24/18 109/72   01/16/18 111/73    Weight from Last 3 Encounters:   01/30/18 189 lb 3.2 oz (85.8 kg)   01/24/18 187 lb 9.6 oz (85.1 kg)   01/16/18 192 lb 6.4 oz (87.3 kg)              Today, you had the following     No orders found for display       Primary Care Provider Office Phone # Fax #    Zulma Nalini Pabon [phone]       [facility] [address] [place] [address]        Equal Access to Services     [facility]I [facility] : Hadii aad ku hadasho Soomaali, waaxda luqadaha, qaybta kaalmada adeegyada, waxay idiin hayaan adeeg kharash la'aan . So [facility] [phone].    ATENCIÓN: Si habla español, tiene a cox disposición servicios gratuitos de asistencia lingüística. Llame al [phone].    We comply with applicable federal civil rights laws and [place] laws. We do not discriminate on the basis of race, color, national origin, age, disability, sex, sexual orientation, or gender identity.            Thank you!     Thank you for choosing [facility]  for your care. Our goal is always to provide you with excellent care. Hearing back from our patients is one way we can continue to improve our services. Please take a few minutes to complete the written survey that you may receive in the mail after your visit with us. Thank you!             Your Updated Medication List - Protect others around you: Learn how to safely use, store and throw away your medicines at www.disposemymeds.org.      Notice  As of 1/24/2018 11:59 PM    You have not been prescribed any medications.      "

## 2018-01-30 ENCOUNTER — OFFICE VISIT (OUTPATIENT)
Dept: FAMILY MEDICINE | Facility: CLINIC | Age: 23
End: 2018-01-30
Payer: COMMERCIAL

## 2018-01-30 VITALS
SYSTOLIC BLOOD PRESSURE: 106 MMHG | RESPIRATION RATE: 18 BRPM | WEIGHT: 189.2 LBS | BODY MASS INDEX: 33.52 KG/M2 | TEMPERATURE: 97.4 F | HEART RATE: 80 BPM | OXYGEN SATURATION: 97 % | HEIGHT: 63 IN | DIASTOLIC BLOOD PRESSURE: 70 MMHG

## 2018-01-30 DIAGNOSIS — Z34.93 NORMAL PREGNANCY, THIRD TRIMESTER: Primary | ICD-10-CM

## 2018-01-30 NOTE — MR AVS SNAPSHOT
"              After Visit Summary   2018    Sarah Will    MRN: [id number]           Patient Information     Date Of Birth          1995        Visit Information        Provider Department      2018 4:00 PM Zulma Pabon MD [facility]        Today's Diagnoses     Normal pregnancy, third trimester    -  1       Follow-ups after your visit        Who to contact     Please call your clinic at [phone] to:    Ask questions about your health    Make or cancel appointments    Discuss your medicines    Learn about your test results    Speak to your doctor            Additional Information About Your Visit        MyChart Information     Inspired Technologies is an electronic gateway that provides easy, online access to your medical records. With Inspired Technologies, you can request a clinic appointment, read your test results, renew a prescription or communicate with your care team.     To sign up for Draytek Technologiest visit the website at [email or web address]   You will be asked to enter the access code listed below, as well as some personal information. Please follow the directions to create your username and password.     Your access code is: [email or web address]  Expires: 3/22/2018  9:00 AM     Your access code will  in 90 days. If you need help or a new code, please contact your [facility] or call [phone] for assistance.        Care EveryWhere ID     This is your Care EveryWhere ID. This could be used by other organizations to access your [place] medical records  [id number]        Your Vitals Were     Pulse Temperature Respirations Height Last Period Pulse Oximetry    80 97.4  F (36.3  C) (Oral) 18 5' 3\" (160 cm) 2017 97%    BMI (Body Mass Index)                   33.52 kg/m2            Blood Pressure from Last 3 Encounters:   18 106/70   18 109/72   18 111/73    Weight from Last 3 Encounters:   18 189 lb 3.2 oz (85.8 kg)   18 187 lb " 9.6 oz (85.1 kg)   01/16/18 192 lb 6.4 oz (87.3 kg)              Today, you had the following     No orders found for display       Primary Care Provider Office Phone # Fax #    Zulma Pabon [phone]       [facility] [address] [place] [address]        Equal Access to Services     [facility]: Hadii aad ku hadasho Soomaali, waaxda luqadaha, qaybta kaalmada adeegyada, waxay idiin hayaan adeeg kharash la'aan ah. So [facility] [phone].    ATENCIÓN: Si habla español, tiene a cox disposición servicios gratuitos de asistencia lingüística. Llame al [phone].    We comply with applicable federal civil rights laws and [place] laws. We do not discriminate on the basis of race, color, national origin, age, disability, sex, sexual orientation, or gender identity.            Thank you!     Thank you for choosing [facility]  for your care. Our goal is always to provide you with excellent care. Hearing back from our patients is one way we can continue to improve our services. Please take a few minutes to complete the written survey that you may receive in the mail after your visit with us. Thank you!             Your Updated Medication List - Protect others around you: Learn how to safely use, store and throw away your medicines at www.disposemymeds.org.      Notice  As of 1/30/2018 11:59 PM    You have not been prescribed any medications.

## 2018-01-30 NOTE — PROGRESS NOTES
"Sarah Will is a 22 year old  female who presents to clinic for a follow up OB visit. She is currently 39w5d with an estimated date of delivery of 2018 via LMP. She denies headache, chest pain, shortness of breath, abdominal pain/contractions, vaginal bleeding, or abnormal discharge. She has felt baby move.     New concerns today: none    Obstetric History       T1      L1     SAB0   TAB0   Ectopic0   Multiple0   Live Births1       # Outcome Date GA Lbr David/2nd Weight Sex Delivery Anes PTL Lv   2 Current            1 Term 12   6 lb 14 oz (3.118 kg) F Vag-Spont   ADRIENNE          Physical exam:  /70 (BP Location: Left arm, Patient Position: Sitting, Cuff Size: Adult Regular)  Pulse 80  Temp 97.4  F (36.3  C) (Oral)  Resp 18  Ht 5' 3\" (160 cm)  Wt 189 lb 3.2 oz (85.8 kg)  LMP 2017  SpO2 97%  BMI 33.52 kg/m2    General: alert, female in no acute distress  Abdomen: gravid uterus appropriate for gestation age at 37 cm above pubic symphysis, non-tender, FHTs 243072v, Leopold's maneuver reveals cephalic positioning  Gyn: 0cm/30%/-1, no discharge  Extremities: no edema    Assessment/Plan:  Patient Active Problem List   Diagnosis     Normal pregnancy, third trimester       Reviewed expectations for final month of pregnancy and when to call/come in.  GBS negative  Blood type  O+  Rubella immune    Plan for natural labor    Follow up in 1 week for routine prenatal visit. Will discuss induction if needed at that time.  Reassuring exam today    Zulma Pabon MD          "

## 2018-01-31 NOTE — PROGRESS NOTES
"Sarah Will is a 22 year old  female who presents to clinic for a follow up OB visit. She is currently 39w5d with an estimated date of delivery of 2018 via LMP. She denies headache, chest pain, shortness of breath, abdominal pain/contractions, vaginal bleeding, or abnormal discharge. She has felt baby move.      New concerns today:   Episode 3 days prior to visit sharp pain upper left side.  Stabbing in nature.  Duration x hours.  Undulating in nature.  Sponstaneously resolved.  Has not returned.  Normal eating and sleep patterns.  Denies pain currently.  Denies active contractions.  COntinues to feel baby move.  Did not call clinic during episode.                   Obstetric History       T1      L1     SAB0   TAB0   Ectopic0   Multiple0   Live Births1        # Outcome Date GA Lbr David/2nd Weight Sex Delivery Anes PTL Lv   2 Current                     1 Term 12     6 lb 14 oz (3.118 kg) F Vag-Spont     ADRIENNE             Physical exam:  /70 (BP Location: Left arm, Patient Position: Sitting, Cuff Size: Adult Regular)  Pulse 80  Temp 97.4  F (36.3  C) (Oral)  Resp 18  Ht 5' 3\" (160 cm)  Wt 189 lb 3.2 oz (85.8 kg)  LMP 2017  SpO2 97%  BMI 33.52 kg/m2     General: alert, female in no acute distress  Abdomen: gravid uterus appropriate for gestation age at 37 cm above pubic symphysis, non-tender, FHTs 120s +fetal movement, Leopold's maneuver reveals cephalic positioning  Gyn: 0cm/10%/-1, no discharge  Extremities: no edema     Assessment/Plan:      Patient Active Problem List   Diagnosis     Normal pregnancy, third trimester         Reviewed expectations for final month of pregnancy and when to call/come in.    Episode upper abdominal pain x days ago.  No return sx.  Reassuring exam today.  Recommend patient call clinic if sx return.   GBS negative  Blood type  O+  Rubella immune     Plan for natural labor     Follow up in 1 week for routine prenatal visit.      Zulma" FILI Pabon MD

## 2018-02-04 ENCOUNTER — TELEPHONE (OUTPATIENT)
Dept: FAMILY MEDICINE | Facility: CLINIC | Age: 23
End: 2018-02-04

## 2018-02-04 DIAGNOSIS — Z34.93 NORMAL PREGNANCY, THIRD TRIMESTER: Primary | ICD-10-CM

## 2018-02-05 NOTE — TELEPHONE ENCOUNTER
Patient is calling about pregnancy. She is currently at 40w3d and was feeling more contractions this morning which has since improved and currently not bothering her. No contractions for the last several hours. She did also noted some brown discharge but denied any signs of blood or bleeding. Endorse good fetal movement throughout and no gush of fluids. No other associated symptoms.    Discuss with patient that this could be signs that she was going into labor. Recommended that she present to [facility] for further evaluation but patient would like to wait and continue monitoring. She has upcoming appointment in clinic tomorrow but would come in tonight if more symptoms developed.    Gave patient warning signs to immediately come to [facility] for labor.    Johnson Ho MD  [facility]  Pager: [phone]

## 2018-03-14 ENCOUNTER — CARE COORDINATION (OUTPATIENT)
Dept: FAMILY MEDICINE | Facility: CLINIC | Age: 23
End: 2018-03-14

## 2018-03-14 NOTE — PROGRESS NOTES
Post date, normal, spontaneous, vaginal delivery 2018 at 40 weeks 4 days. No noted complications during labor or delivery. Labor duration 7 hours 13 mins. Male  ([place] NEPTALI Loveg) 7 lbs 11oz, 21 inches and 36.2 cm. Perineal laceration 2 nd degree. EBL 300ml. One and five mins apgar were 8 and 9. Pregnancy episode resolved. Rasheeda RN

## 2018-03-20 ENCOUNTER — OFFICE VISIT (OUTPATIENT)
Dept: FAMILY MEDICINE | Facility: CLINIC | Age: 23
End: 2018-03-20
Payer: COMMERCIAL

## 2018-03-20 VITALS
TEMPERATURE: 98 F | SYSTOLIC BLOOD PRESSURE: 117 MMHG | BODY MASS INDEX: 30.1 KG/M2 | WEIGHT: 169.9 LBS | OXYGEN SATURATION: 99 % | DIASTOLIC BLOOD PRESSURE: 80 MMHG | HEART RATE: 77 BPM

## 2018-03-20 PROBLEM — Z34.93 NORMAL PREGNANCY, THIRD TRIMESTER: Status: RESOLVED | Noted: 2017-08-27 | Resolved: 2018-03-20

## 2018-03-20 NOTE — MR AVS SNAPSHOT
After Visit Summary   3/20/2018    Sarah Will    MRN: [id number]           Patient Information     Date Of Birth          1995        Visit Information        Provider Department      3/20/2018 4:00 PM Zulma Pabon MD [facility]        Today's Diagnoses     Routine postpartum follow-up    -  1       Follow-ups after your visit        Who to contact     Please call your clinic at [phone] to:    Ask questions about your health    Make or cancel appointments    Discuss your medicines    Learn about your test results    Speak to your doctor            Additional Information About Your Visit        MyChart Information     [employer] is an electronic gateway that provides easy, online access to your medical records. With [employer], you can request a clinic appointment, read your test results, renew a prescription or communicate with your care team.     To sign up for Pansievet visit the website at [email or web address]   You will be asked to enter the access code listed below, as well as some personal information. Please follow the directions to create your username and password.     Your access code is: [email or web address]  Expires: 3/22/2018 10:00 AM     Your access code will  in 90 days. If you need help or a new code, please contact your [facility] or call [phone] for assistance.        Care EveryWhere ID     This is your Care EveryWhere ID. This could be used by other organizations to access your [place] medical records  [id number]        Your Vitals Were     Pulse Temperature Pulse Oximetry BMI (Body Mass Index)          77 98  F (36.7  C) (Oral) 99% 30.1 kg/m2         Blood Pressure from Last 3 Encounters:   18 117/80   18 106/70   18 109/72    Weight from Last 3 Encounters:   18 169 lb 14.4 oz (77.1 kg)   18 189 lb 3.2 oz (85.8 kg)   18 187 lb 9.6 oz (85.1 kg)              Today, you had the  following     No orders found for display       Primary Care Provider Office Phone # Fax #    Zulma Nalini Pabon [phone]       [facility] [address] [place] [address]        Equal Access to Services     ANASTASIAIFEANYI SAY : Hadii aad ku hadasho Soomaali, waaxda luqadaha, qaybta kaalmada adeegyada, waxay idiin hayaan adejordan worthy laemeka lares. So [facility] [phone].    ATENCIÓN: Si habla español, tiene a cox disposición servicios gratuitos de asistencia lingüística. Llame al [phone].    We comply with applicable federal civil rights laws and [place] laws. We do not discriminate on the basis of race, color, national origin, age, disability, sex, sexual orientation, or gender identity.            Thank you!     Thank you for choosing [facility]  for your care. Our goal is always to provide you with excellent care. Hearing back from our patients is one way we can continue to improve our services. Please take a few minutes to complete the written survey that you may receive in the mail after your visit with us. Thank you!             Your Updated Medication List - Protect others around you: Learn how to safely use, store and throw away your medicines at www.disposemymeds.org.          This list is accurate as of 3/20/18  4:38 PM.  Always use your most recent med list.                   Brand Name Dispense Instructions for use Diagnosis    IRON PO      Take 1 tablet by mouth daily

## 2018-03-20 NOTE — PROGRESS NOTES
HPI-Post Partum Visit -       Sarah Will is a 22 year old  female  without a significant past medical history who presents for a postpartum visit. OBSTETRIC HISTORY: .  EDC 18    No  used this visit    Patient Active Problem List   Diagnosis     Normal pregnancy, third trimester       No current outpatient prescriptions on file.                      Delivery date: 18      Patient had a  of viable boy, weight 7 lbs 11 ozs,           with none complications.          Accompanying Signs & Symptoms:                        Breast feeding: bottle feeding.  3 oz every 3 hours   Abdominal pain: no                       Bleeding since delivery: no        Contraception choice: none.          Patient has had intercourse since delivery              and complains of No discomfort.        Last PAP: No results found for: PAP  Pt screened for postpartum depression and complaints are: NEGATIVE         PHQ9= 0        ANABELLA= 0    Receiving dental care YES   Calcium intake is adequate       Allergies   Allergen Reactions     No Known Allergies             Review of Systems:   CONSTITUTIONAL: no fatigue, no unexpected change in weight  SKIN: no worrisome rashes or lesions  EYES: no acute vision problems or changes  ENT: no ear problems, no mouth problems, no throat problems  RESP: no significant cough, no shortness of breath  CV: no chest pain, no palpitations, no new or worsening peripheral edema  GI: no nausea, no vomiting, no constipation, no diarrhea  : no frequency, no dysuria, no hematuria  NEURO: no weakness, no dizziness, no headaches  ENDOCRINE: no temperature intolerance, no skin/hair changes  PSYCHIATRIC: NEGATIVE for changes in mood or trouble with sleep            Physical Exam:     Vitals:    18 1556   BP: 117/80   Pulse: 77   Temp: 98  F (36.7  C)   TempSrc: Oral   SpO2: 99%   Weight: 169 lb 14.4 oz (77.1 kg)       GENERAL: healthy, alert, well nourished, well hydrated, no  distress  HENT: ear canals- normal; TMs- normal; Nose- normal; Mouth- no ulcers, no lesions  NECK: no tenderness, no adenopathy, no asymmetry, no masses, no stiffness; thyroid- normal to palpation  RESP: lungs clear to auscultation - no rales, no rhonchi, no wheezes  CV: regular rates and rhythm, normal S1 S2, no S3 or S4 and no murmur, no click or rub -  ABDOMEN: soft, no tenderness, no  hepatosplenomegaly, no masses, normal bowel sounds  : normal appearing perineum.  Normal healing 2nd degree laceration      Office Visit on 01/09/2018   Component Date Value Ref Range Status     Group B Strep Antigen 01/09/2018 Negative  Negative Final     Allergic To Penicillin 01/09/2018 No   Final         Assessment and Plan   1. Routine postpartum follow-up  -routine healing.  Healthy on exam today    -Tdap for pertussis prophylaxis: completed in pregnancy  -Pap not indicated  -Contraception: none  -recommend PNV if no BC    Options for treatment and follow-up care were reviewed with the patient and/or guardian. Sarah Will and/or guardian engaged in the decision making process and verbalized understanding of the options discussed and agreed with the final plan.    Zulma Pabon MD

## 2020-08-31 ENCOUNTER — OFFICE VISIT (OUTPATIENT)
Dept: FAMILY MEDICINE | Facility: CLINIC | Age: 25
End: 2020-08-31
Payer: COMMERCIAL

## 2020-08-31 VITALS
HEIGHT: 63 IN | DIASTOLIC BLOOD PRESSURE: 87 MMHG | OXYGEN SATURATION: 99 % | HEART RATE: 96 BPM | SYSTOLIC BLOOD PRESSURE: 131 MMHG | WEIGHT: 169 LBS | BODY MASS INDEX: 29.95 KG/M2 | TEMPERATURE: 98.2 F | RESPIRATION RATE: 20 BRPM

## 2020-08-31 DIAGNOSIS — N91.2 AMENORRHEA: Primary | ICD-10-CM

## 2020-08-31 DIAGNOSIS — R10.2 PELVIC PAIN IN FEMALE: ICD-10-CM

## 2020-08-31 LAB
BACTERIA: NORMAL
BILIRUBIN UR: NEGATIVE MG/DL
BLOOD UR: NEGATIVE MG/DL
CASTS: NORMAL /LPF
CLARITY, URINE: CLEAR
COLOR UR: YELLOW
CRYSTAL URINE: NORMAL /LPF
EPITHELIAL CELLS UR: NORMAL /LPF (ref 0–2)
GLUCOSE URINE: NEGATIVE
HCG UR QL: POSITIVE
KETONES UR QL: NEGATIVE MG/DL
LEUKOCYTE ESTERASE UR: ABNORMAL
MUCOUS URINE: NORMAL LPF
NITRITE UR QL STRIP: NEGATIVE MG/DL
PH UR STRIP: 6 [PH] (ref 4.5–8)
PROTEIN UR: NEGATIVE MG/DL
RBC URINE: NORMAL /HPF
SP GR UR STRIP: 1.02 (ref 1–1.03)
UROBILINOGEN UR STRIP-ACNC: ABNORMAL E.U./DL
WBC URINE: NORMAL /HPF

## 2020-08-31 RX ORDER — PRENATAL VIT/IRON FUM/FOLIC AC 27MG-0.8MG
1 TABLET ORAL DAILY
Qty: 90 TABLET | Refills: 3 | Status: SHIPPED | OUTPATIENT
Start: 2020-08-31 | End: 2020-11-16

## 2020-08-31 ASSESSMENT — MIFFLIN-ST. JEOR: SCORE: 1475.71

## 2020-08-31 NOTE — LETTER
September 4, 2020      Sarah Will  [address]  [place] [address]        Dear ,    Your Urine culture was normal.  No need for treatment with antifungals for urinary infection.        Resulted Orders   HCG Qualitative Urine (UPT)  ([facility])   Result Value Ref Range    HCG Qual Urine Positive (A) Negative   Urinalysis, Micro If ([facility])   Result Value Ref Range    Specific Gravity Urine 1.020 1.005 - 1.030    pH Urine 6.0 4.5 - 8.0    Leukocyte Esterase UR Trace (A) NEGATIVE    Nitrite Urine Negative NEGATIVE mg/dL    Protein UR Negative NEGATIVE mg/dL    Glucose Urine Negative NEGATIVE    Ketones Urine Negative NEGATIVE mg/dL    Urobilinogen mg/dL 0.2 E.U./dL 0.2 E.U./dL E.U./dL    Bilirubin UR Negative NEGATIVE mg/dL    Blood UR Negative NEGATIVE mg/dL    Color Urine Yellow     Clarity, urine Clear    Urine Culture ([facility])   Result Value Ref Range    Culture SEE RESULTS BELOW       Comment:      CULTURE, URINE   SOURCE: Urine, Clean Catch   CULTURE RESULTS:    Mixture of urogenital organisms      Narrative    Test performed by:  [facility] [address] [place] [address]   Urine Microscopic ([facility])   Result Value Ref Range    WBC Urine 2-5 <5 /hpf    RBC Urine None <5 /hpf    Epithelial Cells UR 25-50 0 - 2 /lpf    Mucous Urine Few NONE lpf    Casts Urine None NONE /lpf    Crystal Urine None NONE /lpf    Bacteria Wet Prep Moderate None    Narrative    YEAST PRESENT       If you have any questions or concerns, please call the clinic at the number listed above.       Sincerely,        Zulma Pabon MD

## 2020-08-31 NOTE — LETTER
August 31, 2020      Sarah Will  [address] [place] [address]        Dear ,  UA was positive for some yeast in the urine.  It's unclear if this represents a true infection or in an incidental finding so I sent for culture.  Dr Pabon will let you know when urine culture comes back.  Until then I recommend staying hydrated and keeping urine dilute       Resulted Orders   HCG Qualitative Urine (UPT)  ([facility])   Result Value Ref Range    HCG Qual Urine Positive (A) Negative   Urinalysis, Micro If ([facility])   Result Value Ref Range    Specific Gravity Urine 1.020 1.005 - 1.030    pH Urine 6.0 4.5 - 8.0    Leukocyte Esterase UR Trace (A) NEGATIVE    Nitrite Urine Negative NEGATIVE mg/dL    Protein UR Negative NEGATIVE mg/dL    Glucose Urine Negative NEGATIVE    Ketones Urine Negative NEGATIVE mg/dL    Urobilinogen mg/dL 0.2 E.U./dL 0.2 E.U./dL E.U./dL    Bilirubin UR Negative NEGATIVE mg/dL    Blood UR Negative NEGATIVE mg/dL    Color Urine Yellow     Clarity, urine Clear    Urine Microscopic ([facility])   Result Value Ref Range    WBC Urine 2-5 <5 /hpf    RBC Urine None <5 /hpf    Epithelial Cells UR 25-50 0 - 2 /lpf    Mucous Urine Few NONE lpf    Casts Urine None NONE /lpf    Crystal Urine None NONE /lpf    Bacteria Wet Prep Moderate None    Narrative    YEAST PRESENT       If you have any questions or concerns, please call the clinic at the number listed above.       Sincerely,        Zulma Pabon MD

## 2020-08-31 NOTE — PROGRESS NOTES
"       HPI:       Sarah Will is a 25 year old  female who presents to address the following concerns:    LMP 7/4/2020  By LMP would be 8 weeks 2 days today   Not taking prenatal   Due date would be 4/10/20    Experiencing some back discomfort but no fevers  Fatigue but no nausea  Eating normally.          PMHX:     Patient Active Problem List   Diagnosis   (none) - all problems resolved or deleted       Current Outpatient Medications   Medication Sig Dispense Refill     Prenatal Vit-Fe Fumarate-FA (PRENATAL MULTIVITAMIN W/IRON) 27-0.8 MG tablet Take 1 tablet by mouth daily 90 tablet 3          Allergies   Allergen Reactions     No Known Allergies        Results for orders placed or performed in visit on 08/31/20 (from the past 24 hour(s))   HCG Qualitative Urine (UPT)  ([facility])   Result Value Ref Range    HCG Qual Urine Positive (A) Negative   Urinalysis, Micro If ([facility])   Result Value Ref Range    Specific Gravity Urine 1.020 1.005 - 1.030    pH Urine 6.0 4.5 - 8.0    Leukocyte Esterase UR Trace (A) NEGATIVE    Nitrite Urine Negative NEGATIVE mg/dL    Protein UR Negative NEGATIVE mg/dL    Glucose Urine Negative NEGATIVE    Ketones Urine Negative NEGATIVE mg/dL    Urobilinogen mg/dL 0.2 E.U./dL 0.2 E.U./dL E.U./dL    Bilirubin UR Negative NEGATIVE mg/dL    Blood UR Negative NEGATIVE mg/dL    Color Urine Yellow     Clarity, urine Clear        Current Outpatient Medications   Medication     Prenatal Vit-Fe Fumarate-FA (PRENATAL MULTIVITAMIN W/IRON) 27-0.8 MG tablet     No current facility-administered medications for this visit.               Review of Systems:   ROS as described above.  Denies F/S/C/N/V/SOB/CP          Physical Exam:     Vitals:    08/31/20 0928   BP: 131/87   Pulse: 96   Resp: 20   Temp: 98.2  F (36.8  C)   SpO2: 99%   Weight: 76.7 kg (169 lb)   Height: 1.6 m (5' 3\")     Body mass index is 29.94 kg/m .    GEN: patient sitting comfortably in NAD  HEEN: Head is atraumatic, normocephalic, eyes " anicteric, mucous membranes moist  CV: RRR w/o M/R/G  PULM: CTAB without w/r/r  ABD: soft, nontender, bowel sounds present  NEURO: Alert and oriented x3.  No focal motor abnormalities.  Face symmetric.  PSYCH: appropriate  SKIN: No rashes, bruising, or other lesions    Results for orders placed or performed in visit on 08/31/20   HCG Qualitative Urine (UPT)  (P )     Status: Abnormal   Result Value Ref Range    HCG Qual Urine Positive (A) Negative   Urinalysis, Micro If ([facility])     Status: Abnormal   Result Value Ref Range    Specific Gravity Urine 1.020 1.005 - 1.030    pH Urine 6.0 4.5 - 8.0    Leukocyte Esterase UR Trace (A) NEGATIVE    Nitrite Urine Negative NEGATIVE mg/dL    Protein UR Negative NEGATIVE mg/dL    Glucose Urine Negative NEGATIVE    Ketones Urine Negative NEGATIVE mg/dL    Urobilinogen mg/dL 0.2 E.U./dL 0.2 E.U./dL E.U./dL    Bilirubin UR Negative NEGATIVE mg/dL    Blood UR Negative NEGATIVE mg/dL    Color Urine Yellow     Clarity, urine Clear        Assessment and Plan     1. Amenorrhea-UPT positive.  Nursing will call for intake.  Will schedule dating US in 2 weeks, NOB with me after that.  LMP 7/4/20 making DANY 4/10/21  - HCG Qualitative Urine (UPT)  (P )  - Prenatal Vit-Fe Fumarate-FA (PRENATAL MULTIVITAMIN W/IRON) 27-0.8 MG tablet; Take 1 tablet by mouth daily  Dispense: 90 tablet; Refill: 3  - US OB <14 WKS SINGLE OR FIRST GESTATION; Future    2. Some pelvic pain but no dysuria.  No bleeding or vaginal discomfort  - Urinalysis, Micro If (P )  - Urine Culture ([facility])    Options for treatment and follow-up care were reviewed with the patient and/or guardian. Sarah Will and/or guardian engaged in the decision making process and verbalized understanding of the options discussed and agreed with the final plan.    Zulma Pabon MD

## 2020-09-01 ENCOUNTER — TELEPHONE (OUTPATIENT)
Dept: FAMILY MEDICINE | Facility: CLINIC | Age: 25
End: 2020-09-01

## 2020-09-01 LAB — CULTURE: NORMAL

## 2020-09-01 NOTE — TELEPHONE ENCOUNTER
Called, left a message for Sarah to call me back, will need to complete OB intake prior to scheduled NOB 9/21/2020. Rasheeda FRANKLIN

## 2020-09-14 NOTE — TELEPHONE ENCOUNTER
OB intake completed via phone on 2020 with patient. Sarah is a returning OB patient of Dr Pabon, 24 yo,  no history  or miscarriage. Personal and family medical history reviewed, noted as no changes. Sure LMP given 2020, with history regular menses cycles, DANY based on this information is 4/10/2021. Dating ultrasound has been ordered for patient to confirm dates by Dr Pabon on 2020. No concerns voiced during intake. Plan reviewed, discussed with Sarah, for when Dr Pabon is on leave, she will see Dr Brittany Quiñonez for a few months then resume prenatal care with Dr Pabon upon her return. NOB is scheduled with Dr Pabon for 2020. Rasheeda FRANKLIN    Average Risk Category  No significant risk factors: No    At Risk Category (up to 3)  Teen pregnancy: No  Poor social situation: No  Domestic abuse: No  Financial difficulties: No  Smoker: No  H/O  deliver: No  H/O drug abuse: No  Non-English speaking: No  Advanced maternal age: No  GDM risks: No  Previous C/S: No  H/O PIH: No  H/O STIs: No  H/O mental health concerns: No  Onset care > 20 weeks: No  Other: NONE    High Risk Category (4 or more At Risk or)  Diabetes/GDM: No  Multiple gestation: No  Chronic hypertension: No  Significant hx of asthma: No  Fetal demise > 20 weeks: No  Positive tox screen: No  Current mental health treatment: No  Other: NONE    Risk: Average Risk   Date determined: 2020   Rasheeda FRANKLIN

## 2020-09-17 DIAGNOSIS — N91.2 AMENORRHEA: ICD-10-CM

## 2020-09-18 NOTE — RESULT ENCOUNTER NOTE
Please call patient.  US shows single intrauterine pregnancy with DANY 4/11/2020.  Right on with her LMP.  Everything looks normal on US

## 2020-09-21 ENCOUNTER — OFFICE VISIT (OUTPATIENT)
Dept: FAMILY MEDICINE | Facility: CLINIC | Age: 25
End: 2020-09-21
Payer: COMMERCIAL

## 2020-09-21 VITALS
DIASTOLIC BLOOD PRESSURE: 82 MMHG | OXYGEN SATURATION: 100 % | WEIGHT: 169 LBS | BODY MASS INDEX: 29.95 KG/M2 | HEART RATE: 99 BPM | TEMPERATURE: 97.9 F | SYSTOLIC BLOOD PRESSURE: 119 MMHG | HEIGHT: 63 IN

## 2020-09-21 DIAGNOSIS — Z34.01 ENCOUNTER FOR SUPERVISION OF NORMAL FIRST PREGNANCY IN FIRST TRIMESTER: Primary | ICD-10-CM

## 2020-09-21 LAB
BACTERIA: NORMAL
BILIRUBIN UR: NEGATIVE MG/DL
BLOOD UR: NEGATIVE MG/DL
CLUE CELLS: NORMAL
GLUCOSE URINE: NEGATIVE
HCT VFR BLD AUTO: 43.7 % (ref 35–47)
HEMOGLOBIN: 13.1 G/DL (ref 11.7–15.7)
KETONES UR QL: NEGATIVE MG/DL
LEUKOCYTE ESTERASE UR: ABNORMAL
MCH RBC QN AUTO: 28.4 PG (ref 26.5–35)
MCHC RBC AUTO-ENTMCNC: 30 G/DL (ref 32–36)
MCV RBC AUTO: 94.8 FL (ref 78–100)
MOTILE TRICHOMONAS: NEGATIVE
NITRITE UR QL STRIP: NEGATIVE MG/DL
ODOR: NORMAL
PH UR STRIP: 7 [PH] (ref 4.5–8)
PH WET PREP: NORMAL (ref 3.8–4.5)
PLATELET # BLD AUTO: 339 K/UL (ref 150–450)
PROTEIN UR: NEGATIVE MG/DL
RBC # BLD AUTO: 4.61 M/UL (ref 3.8–5.2)
SP GR UR STRIP: 1.02 (ref 1–1.03)
UROBILINOGEN UR STRIP-ACNC: ABNORMAL E.U./DL
WBC # BLD AUTO: 8.3 K/UL (ref 4–11)
WBC WET PREP: <2 (ref 2–5)
YEAST: NORMAL

## 2020-09-21 ASSESSMENT — MIFFLIN-ST. JEOR: SCORE: 1475.71

## 2020-09-21 NOTE — PROGRESS NOTES
First Obstetric Visit       HPI       Sarah Will is a 25 year old woman who presents for an initial prenatal visit at [facility] with DANY of  2021 by2 by Patient's last menstrual period was 2020..  She has not had bleeding since her LMP. She has had mild nausea. Weight loss has not occurred.  This was a planned pregnancy.     OTHER CONCERNS: none     Labor Risk Assessment   Is the patient's age <18 or >40?    No  Patint's BMI is Body mass index is 29.94 kg/m . Does patient have a BMI < 18.5?    No  If previous pregnancy, was delivery within previous 6 months?    No  Have you ever delivered a baby prior to 37 weeks gestation?    No  Did conception for this pregnancy occur via In Vitro Fertilization?    No  Are you carrying twins?    No    Summary:  Patient is Average/high risk for  Labor (verify Problem List includes V23.9 and note risk factor(s) in the Comments)  The patient has the following risk factors for  labor:  none  Average risk pregnancy  Past Medical History  No past medical history on file.  Do you have a history of any of the following medical conditions?    Condition Yes/No   Hypertension No   Heart disease, mitral valve prolapse, rheumatic fever No   Asthma or another chronic lung disease No   An autoimmune disorder No   Kidney disease No   Frequent urinary tract infections No   Migraine headaches No   Stroke, loss of sensation/function, seizures, or other neuro problem No   Diabetes No   Thyroid problems or have you taken thyroid medication No   Hepatitis, liver disease, jaundice No   Blood clots, phlebitis, pulmonary embolism or varicose veins No   Excessive bleeding after surgery or dental work No   Heavy menstrual periods requiring treatment No   Anemia No   Blood transfusions No        Would you refuse a blood transfusion? No   Breast problems No   Abnormalities of the uterus No   Abnormal pap smear No   Have you been treated for an emotional disturbance?  No   Have you been physically, sexually, or emotionally hurt by someone? No   Have you been in a major accident or suffered serious trauma? No   Have you had surgical procedures? No        Have you ever had any complications from anesthesia? No   Have you ever been hospitalized for a nonsurgical reason? No     Notes for positives None    Prenatal Genetic Screening    Do you have a history of any of the following Yes/No        A metabolic disorder (e.g. Insulin-dependent DM, PKU) No        Recurrent pregnancy loss No     Do you, the baby's father, or anyone in your families have Yes/No        Thalassemia AND MCV <80 No        Hemophilia No        Neural tube defect No        Congenital heart defect No        Sickle cell disease or trait No        Muscular dystrophy No        Cystic fibrosis No        Mental retardation or autism No        Down's syndrome No        Ramu-Sach's disease No        Mark's chorea No        Any other inherited genetic or chromosomal disorder No        A child with birth defects not listed above No     Infection History    At high risk for coming in contact with HIV No   Ever treated for tuberculosis No   Ever received the BCG vaccine for tuberculosis No   Ever had genital herpes (or has your partner) No   Had a rash or viral illness since LMP No   Ever had a sexually transmitted infection No   Ever had chicken pox or the vaccine Yes   Ever had any other serious infectious disease No   Up to date with immunizations Yes       Habits  Non-smoker and No ETOH.    Current medications are:  Current Outpatient Medications   Medication Sig Dispense Refill     Prenatal Vit-Fe Fumarate-FA (PRENATAL MULTIVITAMIN W/IRON) 27-0.8 MG tablet Take 1 tablet by mouth daily 90 tablet 3       REVIEW OF SYSTEMS  CONSTITUTIONAL: NEGATIVE for fever, chills, change in weight  ENT: NEGATIVE for ear, mouth and throat problems  RESP: NEGATIVE for significant cough or SOB  CV: NEGATIVE for chest pain, palpitations  "or peripheral edema  GI: NEGATIVE for nausea, abdominal pain, heartburn, or change in bowel habits  : NEGATIVE for unusual urinary or vaginal symptoms. Periods are regular.  NEURO: NEGATIVE for weakness, dizziness or paresthesias  C: NEGATIVE for fever, chills, change in weight  I: NEGATIVE for worrisome rashes, moles or lesions  E: NEGATIVE for vision changes or irritation  R: NEGATIVE for significant cough or SOB  B: NEGATIVE for masses, tenderness or discharge  M: NEGATIVE for significant arthralgias or myalgia  N: NEGATIVE for weakness, dizziness or paresthesias  P: NEGATIVE for changes in mood or affect  ====================================================    PHYSICAL EXAM:  /82   Pulse 99   Temp 97.9  F (36.6  C) (Oral)   Ht 1.6 m (5' 3\")   Wt 76.7 kg (169 lb)   LMP 07/04/2020   SpO2 100%   BMI 29.94 kg/m         GENERAL:  Pleasant pregnant female, alert, well groomed.  SKIN:  Warm and dry, without lesions or rashes  HEAD: Symmetrical features.  EYES:  PERRL, EOMI.  MOUTH:  Buccal mucosa pink, moist without lesions.    NECK:  Thyroid without enlargement and nodules.  Lymph nodes not palpable.  LUNGS:  Clear to auscultation.  BREAST:  Symmetrical.  No dominant, fixed or suspicious masses are noted.  No skin or nipple changes or axillary nodes.  Nipples everted.      HEART:  RRR without murmur.  ABDOMEN: Soft without masses, tenderness or organomegaly.  No CVA tenderness. No scars noted.. FHT not assessed today.  HR recently identified on US  MUSCULOSKELETAL:  Full range of motion  EXTREMITIES:  No edema. No significant varicosities.   GENITALIA:  BUS WNL, no lesions noted   VAGINA:  Pink, normal rugae and discharge white, no bleeding.  No discomfort with exam  CERVIX:  smooth, without discharge or CMT and parous os,   firm/ closed   UTERUS: Anteverted, nontender 12 weeks in size.  ADNEXA: Without masses or tenderness  =========================================    ASSESSMENT/PLAN     Average risk " pregnancy  Recommended weight gain: 15-25 lbs.  Instructed on best evidence for: weight gain for her BMI for pregnancy; healthy diet and foods to avoid; exercise and activity during pregnancy;  avoiding exposure to toxoplasmosis; and maintenance of a generally healthy lifestyle.   Discussed the harms, benefits, side effects and alternative therapies for current prescribed and OTC medications.  Patient declines genetic screen.     Patient will follow up with me for virtual visit in 4 weeks.  Will follow with another provider during my maternity leave and then return to my care     Options for treatment and follow-up care were reviewed with the patient and/or guardian. Sarah Will and/or guardian engaged in the decision making process and verbalized understanding of the options discussed and agreed with the final plan.    Zulma Pabon MD

## 2020-09-21 NOTE — RESULT ENCOUNTER NOTE
Please call patient with result    Wet prep and ua were normal from yesterdays visit.  No evidence of bladder or vaginal infection

## 2020-09-22 LAB
ABO + RH BLD: NORMAL
CULTURE: NORMAL
REPEAT ABO/RH TYPING (HML): NORMAL

## 2020-09-23 ENCOUNTER — RECORDS - HEALTHEAST (OUTPATIENT)
Dept: ADMINISTRATIVE | Facility: OTHER | Age: 25
End: 2020-09-23

## 2020-09-23 LAB
CYTOLOGY CVX/VAG DOC THIN PREP: NORMAL
HIGH RISK?: NO
HPV REFLEX?: NORMAL
LAB AP ABNORMAL BLEEDING: NO
LAB AP BIRTH CONTROL/HORMONES: NORMAL
LAB AP CERVICAL APPEARANCE: NORMAL
LAB AP PATIENT STATUS: NORMAL
LAB AP PREVIOUS ABNL: NO
LAB AP PREVIOUS NORMAL: 2017
LAST MENS PERIOD: NORMAL
PATH REPORT.COMMENTS IMP SPEC: NORMAL
PATH REPORT.COMMENTS IMP SPEC: NORMAL
SPECIMEN ADEQUACY:: NORMAL

## 2020-10-05 LAB
C TRACH RRNA CVX QL NAA+PROBE: NEGATIVE
HBV SURFACE AG SERPL QL IA: NEGATIVE
HIV 1+2 AB+HIV1 P24 AG SERPL QL IA: NEGATIVE
N GONORRHOEA RRNA SPEC QL NAA+PROBE: NEGATIVE
RUBV IGG SERPL QL IA: NEGATIVE
TREPONEMA ANTIBODY (SYPHILIS): NEGATIVE

## 2020-10-13 ENCOUNTER — VIRTUAL VISIT (OUTPATIENT)
Dept: FAMILY MEDICINE | Facility: CLINIC | Age: 25
End: 2020-10-13
Payer: COMMERCIAL

## 2020-10-13 DIAGNOSIS — Z34.01 ENCOUNTER FOR SUPERVISION OF NORMAL FIRST PREGNANCY IN FIRST TRIMESTER: Primary | ICD-10-CM

## 2020-10-13 PROCEDURE — 99207 PR PRENATAL VISIT: CPT | Mod: 95 | Performed by: STUDENT IN AN ORGANIZED HEALTH CARE EDUCATION/TRAINING PROGRAM

## 2020-10-13 NOTE — PROGRESS NOTES
"Family Medicine Telephone Visit Note               Telephone Visit Consent   Patient was verbally read the following and verbal consent was obtained.    \"Telephone visits are billed at different rates depending on your insurance coverage. During this emergency period, for some insurers they may be billed the same as an in-person visit.  Please reach out to your insurance provider with any questions.  If during the course of the call the physician/provider feels a telephone visit is not appropriate, you will not be charged for this service.\"    Name person giving consent:  Patient   Date verbal consent given:  10/13/2020  Time verbal consent given:  7:59 AM           No chief complaint on file.           HPI   Patients name: Sarah  Appointment start time:  8:14 AM    Sarah Will is a 25 year old  female who presents to clinic for a follow up OB visit. She is currently 14w2d with an estimated date of delivery of 2021 via early . She denies headache, chest pain, shortness of breath, abdominal pain/contractions, vaginal bleeding, or abnormal discharge. She has felt baby move.     Appetite is OK.      New concerns today: none.  Has stopped using the wrist splint     OB History    Para Term  AB Living   3 2 2 0 0 2   SAB TAB Ectopic Multiple Live Births   0 0 0 0 2      # Outcome Date GA Lbr David/2nd Weight Sex Delivery Anes PTL Lv   3 Current            2 Term 18 40w4d  3.487 kg (7 lb 11 oz) M  None N ADRIENNE      Birth Comments: perineal laceration 2nd degree, length- 21 inches, 36.2 cm      Name: Jake Pollard      Apgar1: 8  Apgar5: 9   1 Term 12   3.118 kg (6 lb 14 oz) F Vag-Spont   ADRIENNE       Physical exam:  LMP 2020     General: alert, female in no acute distress  Pulm/CV: conversing normally without difficulty  Neuro: thoughts logical and intact    Assessment/Plan:  Patient Active Problem List   Diagnosis   (none) - all problems resolved or deleted       Reviewed " "pre- labs. Follow up: Rubella non-immune.  Will need MMR after delivery   Patient declines genetic screening  Continued recommendation for breastfeeding.  Discussed warning signs to watch for and expectations for second trimester.     Follow up in 4 weeks for routine prenatal visit.  This will be with Dr Morrow      LMP 2020   Estimated body mass index is 29.94 kg/m  as calculated from the following:    Height as of 20: 1.6 m (5' 3\").    Weight as of 20: 76.7 kg (169 lb).      After Visit Information:  None  Clinic will call for inperson visit with Cristhian    Appointment end time: 8:23 AM  This is a telephone visit that took 11 minutes.      Clinician location:  [facility]     Zulma Pabon MD    "

## 2020-11-16 ENCOUNTER — OFFICE VISIT (OUTPATIENT)
Dept: FAMILY MEDICINE | Facility: CLINIC | Age: 25
End: 2020-11-16
Payer: COMMERCIAL

## 2020-11-16 VITALS
DIASTOLIC BLOOD PRESSURE: 80 MMHG | HEIGHT: 63 IN | SYSTOLIC BLOOD PRESSURE: 124 MMHG | WEIGHT: 172.2 LBS | BODY MASS INDEX: 30.51 KG/M2 | OXYGEN SATURATION: 100 % | HEART RATE: 97 BPM | TEMPERATURE: 98.9 F | RESPIRATION RATE: 20 BRPM

## 2020-11-16 DIAGNOSIS — Z23 NEED FOR PROPHYLACTIC VACCINATION AND INOCULATION AGAINST INFLUENZA: ICD-10-CM

## 2020-11-16 DIAGNOSIS — Z34.82 ENCOUNTER FOR SUPERVISION OF OTHER NORMAL PREGNANCY, SECOND TRIMESTER: Primary | ICD-10-CM

## 2020-11-16 PROCEDURE — 99207 PR PRENATAL VISIT: CPT | Mod: GC | Performed by: STUDENT IN AN ORGANIZED HEALTH CARE EDUCATION/TRAINING PROGRAM

## 2020-11-16 PROCEDURE — 90471 IMMUNIZATION ADMIN: CPT | Performed by: STUDENT IN AN ORGANIZED HEALTH CARE EDUCATION/TRAINING PROGRAM

## 2020-11-16 PROCEDURE — 90686 IIV4 VACC NO PRSV 0.5 ML IM: CPT | Performed by: STUDENT IN AN ORGANIZED HEALTH CARE EDUCATION/TRAINING PROGRAM

## 2020-11-16 RX ORDER — PRENATAL VIT/IRON FUM/FOLIC AC 27MG-0.8MG
1 TABLET ORAL DAILY
Qty: 90 TABLET | Refills: 3 | Status: SHIPPED | OUTPATIENT
Start: 2020-11-16

## 2020-11-16 ASSESSMENT — MIFFLIN-ST. JEOR: SCORE: 1493.22

## 2020-11-16 NOTE — PATIENT INSTRUCTIONS
[facility] Information  If you have any further concerns or wish to schedule another appointment, please call our office at [phone] during normal business hours (8-5, M-F).     For uncomplicated pregnancies, you will be seeing your doctor once a month until you are 28 weeks, then you will see your doctor twice a month. You will begin weekly visits at 36 weeks until you deliver.     If you have urgent medical questions that cannot wait, you may call [phone] at any time of day. Call your doctor if you experience any bleeding or abdominal cramping early in pregnancy.     If you have a medical emergency, please call [phone].    When to call or come in to the hospital    If you have any bleeding or leakage of fluids.     If you have lower abdominal/uterine cramping not relieved with rest and fluids.    If you have a headache not resolved with tylenol, right upper abdominal pain, or sudden onset of swelling.    You know your body best. Never hesitate to call or go to the hospital if something doesn't feel right!    [facility]   Address: [address] [place] [address]. [place] [address]   Phone: [phone]    Fetal Survey Ultrasound  We will arrange for an ultrasound around 22 weeks gestation. These are done in clinic on . Many families look forward to this visit as a chance to try and determine the babies gender - however, it is an important exam to ensure that baby is developing and growing normally!    Consider childbirth education classes  Childbirth classes are a great way to learn what to expect from the remainder of pregnancy, tips for preparing and handeling the stresses of labor, and learning more about your . Classes are also great for learning more about breastfeeding! Discuss options for classes with your doctor if you are interested.

## 2020-11-16 NOTE — PROGRESS NOTES
"Sarah Will is a 25 year old  female who presents to clinic for a follow up OB visit. She is currently 19w1d with an estimated date of delivery of 2021 via 1st trimester US. She denies headache, chest pain, shortness of breath, abdominal pain/contractionsvaginal bleeding, or abnormal discharge. She reports that she has started to feel baby move. She has been taking her prenatal vitamins and is need of a refill. No other new concerns.     OB History    Para Term  AB Living   3 2 2 0 0 2   SAB TAB Ectopic Multiple Live Births   0 0 0 0 2      # Outcome Date GA Lbr David/2nd Weight Sex Delivery Anes PTL Lv   3 Current            2 Term 18 40w4d  3.487 kg (7 lb 11 oz) M  None N ADRIENNE      Birth Comments: perineal laceration 2nd degree, length- 21 inches, 36.2 cm      Name: Jake Pollard      Apgar1: 8  Apgar5: 9   1 Term 12   3.118 kg (6 lb 14 oz) F Vag-Spont   ADRIENNE     Physical exam:  /80 (BP Location: Right arm, Patient Position: Sitting, Cuff Size: Adult Regular)   Pulse 97   Temp 98.9  F (37.2  C) (Oral)   Resp 20   Ht 1.605 m (5' 3.19\")   Wt 78.1 kg (172 lb 3.2 oz)   LMP 2020   SpO2 100%   BMI 30.32 kg/m      General: alert, female in no acute distress  Abdomen: gravid uterus appropriate for gestation age  above pubic symphysis, non-tender, FHTs 150s.  Extremities: no edema    Assessment/Plan:  Patient Active Problem List   Diagnosis     Encounter for supervision of other normal pregnancy, second trimester     Reviewed prenatal labs - rubella non-immune, will need MMR after delivery.   Fetal anatomy ultrasound planned for 21-22 weeks.  Discussed expectations of second trimester and when to call/come in.    Follow up in 2-3 weeks for routine prenatal visit and fetal survey.    Flu shot administered today.     Refill of prenatal vitamins prescribed.     Karthikeyan Silva, MS3  [facility]    I was present with the medical student who participated in " the service and in the documentation of this note. I have verified the history and personally performed the physical exam and medical decision making, and have verified the content of the note, which accurately reflects my assessment of the patient and the plan of care.    Shahida Morrow MD  [facility] Medicine Resident    Precepted with: Paxton Piedra MD

## 2020-11-16 NOTE — NURSING NOTE
"Chief Complaint   Patient presents with     Prenatal Care     19w 1d. No other concerns. Ok for flu shot today.     Medication Reconciliation     completed.        /80 (BP Location: Right arm, Patient Position: Sitting, Cuff Size: Adult Regular)   Pulse 97   Temp 98.9  F (37.2  C) (Oral)   Resp 20   Ht 1.605 m (5' 3.19\")   Wt 78.1 kg (172 lb 3.2 oz)   LMP 07/04/2020   SpO2 100%   BMI 30.32 kg/m      BP Recheck if applicable: NA      ~ Yogi Colbert CMA ([place])  [facility]  Phone: [phone]    "

## 2020-11-17 NOTE — PROGRESS NOTES
Preceptor Attestation:   Patient seen, evaluated and discussed with the resident. I have verified the content of the note, which accurately reflects my assessment of the patient and the plan of care.    Supervising Physician:Paxton Piedra MD    [facility]

## 2020-12-03 ENCOUNTER — ANCILLARY PROCEDURE (OUTPATIENT)
Dept: ULTRASOUND IMAGING | Facility: CLINIC | Age: 25
End: 2020-12-03
Attending: FAMILY MEDICINE
Payer: COMMERCIAL

## 2020-12-03 DIAGNOSIS — Z34.82 ENCOUNTER FOR SUPERVISION OF OTHER NORMAL PREGNANCY, SECOND TRIMESTER: ICD-10-CM

## 2020-12-14 ENCOUNTER — OFFICE VISIT (OUTPATIENT)
Dept: FAMILY MEDICINE | Facility: CLINIC | Age: 25
End: 2020-12-14
Payer: COMMERCIAL

## 2020-12-14 VITALS
RESPIRATION RATE: 16 BRPM | HEIGHT: 63 IN | BODY MASS INDEX: 31.25 KG/M2 | DIASTOLIC BLOOD PRESSURE: 81 MMHG | OXYGEN SATURATION: 99 % | WEIGHT: 176.4 LBS | HEART RATE: 106 BPM | SYSTOLIC BLOOD PRESSURE: 126 MMHG | TEMPERATURE: 97.7 F

## 2020-12-14 DIAGNOSIS — Z34.82 ENCOUNTER FOR SUPERVISION OF OTHER NORMAL PREGNANCY, SECOND TRIMESTER: Primary | ICD-10-CM

## 2020-12-14 PROCEDURE — 99207 PR PRENATAL VISIT: CPT | Mod: GC | Performed by: STUDENT IN AN ORGANIZED HEALTH CARE EDUCATION/TRAINING PROGRAM

## 2020-12-14 ASSESSMENT — MIFFLIN-ST. JEOR: SCORE: 1513.24

## 2020-12-14 NOTE — PROGRESS NOTES
"Sarah Will is a 25 year old  female who presents to clinic for a follow up OB visit. She is currently 23w1d with an estimated date of delivery of 2021 via 10w US. She denies headache, chest pain, shortness of breath, abdominal pain/contractions, vaginal bleeding, or abnormal discharge. She has not felt baby move.     New concerns today: none.     OB History    Para Term  AB Living   3 2 2 0 0 2   SAB TAB Ectopic Multiple Live Births   0 0 0 0 2      # Outcome Date GA Lbr David/2nd Weight Sex Delivery Anes PTL Lv   3 Current            2 Term 18 40w4d  3.487 kg (7 lb 11 oz) M  None N ADRIENNE      Birth Comments: perineal laceration 2nd degree, length- 21 inches, 36.2 cm      Name: Jake Pollard      Apgar1: 8  Apgar5: 9   1 Term 12   3.118 kg (6 lb 14 oz) F Vag-Spont   ADRIENNE       Physical exam:  /81   Pulse 106   Temp 97.7  F (36.5  C) (Oral)   Resp 16   Ht 1.607 m (5' 3.25\")   Wt 80 kg (176 lb 6.4 oz)   LMP 2020   SpO2 99%   BMI 31.00 kg/m      General: alert, female in no acute distress  Abdomen: gravid uterus appropriate for gestation age at 23 cm above pubic symphysis, non-tender, FHTs 154  Extremities: no edema    Assessment/Plan:  Patient Active Problem List   Diagnosis     Encounter for supervision of other normal pregnancy, second trimester     Fetal survey ultrasound done - normal growth and development  Flu shot given   Discussed pre-term labor signs and symptoms and when to call/come in.   Discussed expectations for gestational diabetes screen to be completed at next visit.    Follow up in 4 weeks for routine prenatal visit.     Shahida Morrow MD  [facility] Medicine Resident    Precepted with: Marin Shepard MD            "

## 2020-12-14 NOTE — PATIENT INSTRUCTIONS
[facility] Information  If you have any further concerns or wish to schedule another appointment, please call our office at [phone] during normal business hours (8-5, M-F).     For uncomplicated pregnancies, you will be seeing your doctor once a month until you are 28 weeks, then you will see your doctor twice a month. You will begin weekly visits at 36 weeks until you deliver.     If you have urgent medical questions that cannot wait, you may call [phone] at any time of day.     If you have a medical emergency, please call [phone].    When to call or come in to the hospital    If you notice a decrease in your baby's movement.     If your begin to experience contractions that are 5 minutes or less apart and lasting for over 45 seconds, or if contractions are becoming more painful.    If you have any bleeding or leakage of fluids.     If you have a headache not resolved with tylenol, right upper abdominal pain, or sudden onset of swelling.    You know your body best. Never hesitate to call or go to the hospital if something doesn't feel right!    [facility]   Address: [address] [place] [address]. [place] [address]   Phone: [phone]  Gestational Diabetes Screen  At your next visit, you will be screened for gestational diabetes. You will drink a sugary drink and then have your blood drawn to see how your body responds to a sugar load. This test takes about an hour to complete - please plan your schedule accordingly!  The Benefits of Breastfeeding   Breastfeeding gives your new baby the very best start. It supplies nutrition, comfort, and love. Experts agree: Breastfeeding is the healthiest choice for babies during the first year of life and beyond. It s healthy for Mom, too. Breastfeeding may be challenging at first. But with support, you and your baby can succeed together.   Healthiest for Baby   Breastmilk is the ideal food for babies. It has all the nutrients your little one needs to  grow healthy and strong. Here are some of the many benefits for your baby:     Breastfeeding provides contact that babies love. Frequent skin-to-skin time with Mom is calming and comforting.     Breastmilk is full of antibodies. These are substances that help your baby fight infection. Breastmilk reduces your baby's risk of respiratory illnesses, ear infections, and diarrhea.     Breastfeeding reduces your baby s risk of allergies, colds, and many other diseases.     Breastmilk changes as your baby grows, meeting her changing needs.     Breastmilk is easy for your baby to digest.     Breastmilk contains DHA, a fat that is good for your baby s developing brain and eyes.     Breastmilk decreases the risk of sudden infant death syndrome (SIDS).  Healthiest for Mom   For many women, breastfeeding is an amazing experience. It creates a strong bond between mother and baby. Other benefits for Mom include:     You can feel proud knowing that your baby is growing healthy and strong because of your milk.     Breastmilk is convenient. It s free, clean, and always the right temperature.     Breastfeeding burns calories. This can help you lose pregnancy weight faster.     Breastfeeding releases hormones that contract the uterus. This helps the uterus return to its normal size after childbirth.     Mothers who breastfeed have a decreased risk of ovarian and breast cancers.  There are many people who can help you learn to breastfeed. A lactation consultant is a healthcare provider specially trained to help breastfeeding moms. A lactation consultant will visit you after delivery and is available after your baby is born - if you'd like to meet with lactation prior to delivery, let your doctor know!

## 2021-01-13 NOTE — PROGRESS NOTES
Sarah Will is a 25 year old  female who presents to clinic for a follow up OB visit. She is currently 27w3d with an estimated date of delivery of 2021 via 10w US. She denies headache, chest pain, shortness of breath, abdominal pain/contractions, vaginal bleeding, or abnormal discharge. She has felt baby move.     New concerns today:   - intermittent low back pain   - would like letter for work to work remotely from 36 weeks and beyond due to pandemic    OB History    Para Term  AB Living   3 2 2 0 0 2   SAB TAB Ectopic Multiple Live Births   0 0 0 0 2      # Outcome Date GA Lbr David/2nd Weight Sex Delivery Anes PTL Lv   3 Current            2 Term 18 40w4d  3.487 kg (7 lb 11 oz) M  None N ADRIENNE      Birth Comments: perineal laceration 2nd degree, length- 21 inches, 36.2 cm      Name: Jake Pollard      Apgar1: 8  Apgar5: 9   1 Term 12   3.118 kg (6 lb 14 oz) F Vag-Spont   ADRIENNE       Physical exam:  /74   Pulse 106   Temp 98  F (36.7  C) (Oral)   Resp 20   Wt 81.6 kg (180 lb)   LMP 2020   SpO2 98%   BMI 31.63 kg/m      General: alert, female in no acute distress  Abdomen: gravid uterus appropriate for gestation age at 27 cm above pubic symphysis, non-tender, FHTs 150s  Extremities: no edema    Assessment/Plan:  Patient Active Problem List   Diagnosis     Encounter for supervision of other normal pregnancy, second trimester       Information given on gestational diabetes. 1 hour glucose tolerance test today, in addition to CBC and syphilis.   Blood type O positive. Rhogam not indicated.  TDAP (26-36 wks) will be given today  Discussed signs and symptoms of  labor.   Evaluate for decreased fetal movement.     Provided work note for remote duties starting at 36 weeks (3/14/21)    Follow up in 4 weeks for routine prenatal visit.     Shahida Morrow MD  [facility] Family Medicine Resident    Precepted with: Paxton Piedra MD

## 2021-01-14 ENCOUNTER — OFFICE VISIT (OUTPATIENT)
Dept: FAMILY MEDICINE | Facility: CLINIC | Age: 26
End: 2021-01-14
Payer: COMMERCIAL

## 2021-01-14 VITALS
OXYGEN SATURATION: 98 % | TEMPERATURE: 98 F | DIASTOLIC BLOOD PRESSURE: 74 MMHG | WEIGHT: 180 LBS | BODY MASS INDEX: 31.63 KG/M2 | SYSTOLIC BLOOD PRESSURE: 122 MMHG | HEART RATE: 106 BPM | RESPIRATION RATE: 20 BRPM

## 2021-01-14 DIAGNOSIS — Z34.82 ENCOUNTER FOR SUPERVISION OF OTHER NORMAL PREGNANCY, SECOND TRIMESTER: Primary | ICD-10-CM

## 2021-01-14 LAB
% GRANULOCYTES: 78.3 %G (ref 40–75)
GLU GEST SCREEN 1HR 50G: 91 (ref 0–129)
GRANULOCYTES #: 8.1 K/UL (ref 1.6–8.3)
HCT VFR BLD AUTO: 37.2 % (ref 35–47)
HEMOGLOBIN: 11.4 G/DL (ref 11.7–15.7)
LYMPHOCYTES # BLD AUTO: 1.8 K/UL (ref 0.8–5.3)
LYMPHOCYTES NFR BLD AUTO: 17.5 %L (ref 20–48)
MCH RBC QN AUTO: 29.1 PG (ref 26.5–35)
MCHC RBC AUTO-ENTMCNC: 30.6 G/DL (ref 32–36)
MCV RBC AUTO: 95 FL (ref 78–100)
MID #: 0.4 K/UL (ref 0–2.2)
MID %: 4.2 %M (ref 0–20)
PLATELET # BLD AUTO: 276 K/UL (ref 150–450)
RBC # BLD AUTO: 3.92 M/UL (ref 3.8–5.2)
WBC # BLD AUTO: 10.3 K/UL (ref 4–11)

## 2021-01-14 PROCEDURE — 99207 PR PRENATAL VISIT: CPT | Mod: GC | Performed by: STUDENT IN AN ORGANIZED HEALTH CARE EDUCATION/TRAINING PROGRAM

## 2021-01-14 PROCEDURE — 85025 COMPLETE CBC W/AUTO DIFF WBC: CPT | Performed by: STUDENT IN AN ORGANIZED HEALTH CARE EDUCATION/TRAINING PROGRAM

## 2021-01-14 PROCEDURE — 90715 TDAP VACCINE 7 YRS/> IM: CPT | Performed by: STUDENT IN AN ORGANIZED HEALTH CARE EDUCATION/TRAINING PROGRAM

## 2021-01-14 PROCEDURE — 36415 COLL VENOUS BLD VENIPUNCTURE: CPT | Performed by: STUDENT IN AN ORGANIZED HEALTH CARE EDUCATION/TRAINING PROGRAM

## 2021-01-14 PROCEDURE — 90471 IMMUNIZATION ADMIN: CPT | Performed by: STUDENT IN AN ORGANIZED HEALTH CARE EDUCATION/TRAINING PROGRAM

## 2021-01-14 PROCEDURE — 82950 GLUCOSE TEST: CPT | Performed by: STUDENT IN AN ORGANIZED HEALTH CARE EDUCATION/TRAINING PROGRAM

## 2021-01-14 NOTE — PATIENT INSTRUCTIONS
[facility] Information  If you have any further concerns or wish to schedule another appointment, please call our office at [phone] during normal business hours (8-5, M-F).     For uncomplicated pregnancies, you will be seeing your doctor once a month until you are 28 weeks, then you will see your doctor twice a month. You will begin weekly visits at 36 weeks until you deliver.     If you have urgent medical questions that cannot wait, you may call [phone] at any time of day.     If you have a medical emergency, please call [phone].    When to call or come in to the hospital    If you notice a decrease in your baby's movement.     If your begin to experience contractions that are 5 minutes or less apart and lasting for over 45 seconds, or if contractions are becoming more painful.    If you have any bleeding or leakage of fluids.     If you have a headache not resolved with tylenol, right upper abdominal pain, or sudden onset of swelling.    You know your body best. Never hesitate to call or go to the hospital if something doesn't feel right!    [facility]   Address: [address] [place] [address]. [place] [address]   Phone: [phone]  Blood Glucose Screening During Pregnancy   Gestational diabetes is diabetes that only pregnant women get. Changes in your body during pregnancy can cause high blood sugar (glucose). This can cause problems for you and your baby. It is a serious condition, but it can be controlled.  What to Know If You Test Positive     Gestational diabetes is treatable. The best way to control gestational diabetes is to find out you have it as early as possible and start treatment quickly.     Gestational diabetes can cause problems for the mother during pregnancy. It can also cause problems with the baby during pregnancy, delivery, and after. Treatment greatly lowers the chance for problems.     The changes in your body that cause gestational diabetes normally occur only when  you are pregnant. After the baby is born, your body goes back to normal and the condition goes away. You may be more likely to have type 2 diabetes later, though. So talk to your doctor about ways to help prevent type 2 diabetes.  Treating Gestational Diabetes     You ll need to check your blood sugar regularly. You can do this at home by pricking your finger and checking a drop of blood on a glucose monitor. Your health care provider will show you how and when to check your blood sugar and discuss your target blood sugar level.     To manage your blood sugar, you will be given a special plan. It will likely involve planning your meals and getting regular exercise. Some women need to take a hormone called insulin, or an oral hypoglycemic medication to help control their blood sugar.    Making Plans for Feeding Your Baby  By this point, you probably have read a lot about feeding your baby. Breastfeed or formula? Each mother s decision is her own and [facility] respects you and your choices. We ve gathered information on both breastfeeding and formula feeding to help with your decision. Talking with your physician or nurse-midwife can also help in your decision. However you plan to feed your baby, [facility] encourage rooming in with your baby, skin-to-skin contact and feeding your baby based on his or her cues.    Skin-to-skin contact  Being close to mom helps your baby adjust to life outside of the womb. It helps your baby regulate their body temperature, heart rate, and breathing. Your baby will usually be placed skin-to-skin immediately following birth or as soon as possible, if medical intervention is needed.    Rooming-In  Having your baby stay with you in your room is called  rooming-in.  Keeping your baby in your room helps you to learn how to care for your baby by getting to know your baby s cues, body rhythms and sleep cycle.       Cue-based feeding  Cues (signals) are baby s way of  telling you what he or she wants. When you learn your infant s cues, you know how to care for and feed your baby. Feeding cues are the licking and smacking of lips, bringing their fist to their mouth, and a reflex called  rooting - where baby turns and opens his or her mouth, searching for the breast or bottle. Crying is a late feeding cue. Babies can feed frequently, often at least 8 times in 24 hours.  Breastfeeding facts  Breast milk is the best source of nutrition for your baby and is available at birth. In the first couple of days, your milk volume is already starting to increase, though it may not be noticeable. Breastfeed frequently to increase your milk supply. Within three to five days, you will begin to notice larger milk volumes. An increase in breast size, heaviness and firmness are often described as the milk  coming in.  Frequent breastfeeding can help breasts from getting overly firm and painful. You will know the baby is getting enough milk if your baby is having wet and dirty diapers and gaining weight.     If your goal is to exclusively breastfeed, it is important to not use any formula or artificial nipples (including bottles and pacifiers) while your baby is learning to breastfeed. While it may seem like an  easy  option to give your baby a bottle, formula should only be given if there is a medical reason for your baby to have it.    Positioning and attachment   Get comfortable. Use pillows as needed to support your arms and baby. Hold baby close at the level of your breast, facing you in a tummy to tummy position. Skin to skin helps with this. Position the baby with his or her nose by the nipple. There should be a straight line from baby s ear to shoulder to hips.  Tickle your baby s lips or wait for baby to open mouth wide, bring baby to breast by leading with the chin. Aim the nipple at the roof of baby s mouth. A rapid sucking pattern is followed by longer, drawing pattern with occasional  swallows heard. When baby is correctly latched, your nipple and much of the areola are pulled well into baby s mouth.      Returning to work or school  Focus on a good start to breastfeeding. Many women continue to provide breastmilk for their baby when they return to work or school. Making plans about where to pump and store milk can make the transition go well. Talk with other mothers who have also returned to work or school for tips and support. Your employer s Human Resource department may be a resource as well.        babies can mean fewer  sick  days for you.    A quality breast pump will also save time and add comfort. Check with your insurance prior to giving birth for breast pump coverage. Many insurance companies include a pump within your benefits.    Wait until your baby is at least three weeks old to introduce a bottle for the first time. Have someone besides you give the bottle.    Breastfeed when you are with your baby. Reserve your bottles of breast milk for when you are away.     Your breasts will need to be  emptied  either by your baby or a pump. Plan to pump at least twice in an eight hour day.    If you cannot pump at work, continue breastfeeding at home. Any amount of breast milk is worth giving to your baby.    Formula feeding facts  If you are planning to use formula to feed your baby, you will want to make some preparations ahead of time. Talk to your doctor or nurse-midwife about what type of formula to use. Some are iron-fortified, meaning they have extra iron in them. You will want to purchase formula and bottles before your baby is born to be sure you are ready after you return from the hospital. The [facility] do not provide formula samples to take home.    Be sure to follow formula mixing directions closely. Regular milk in the dairy case at the grocery store should not be given to babies under 1 year old. Baby formula is sold in several forms  including:    Ready-to-use. This is the most expensive, but no mixing is necessary.    Concentrated liquid. This is less expensive than ready-to-use and you mix with water.    Powder. This is the least expensive. You mix one level scoop of powdered formula with two ounces of water and stir well.    Most babies need 2.5 ounces of formula per pound of body weight each day. This means an 8-pound baby may drink about 20 ounces of formula a day; however, this is just an estimate. The most important thing is to pay attention to your baby s cues. If your baby is always fussy, needs more iron or has certain food allergies, your physician may suggest you change your baby s formula to a different kind.     How do I warm my baby s bottles?  You may feed your baby a bottle without warming it first. It is OK for the breast milk or formula to be cool or room temperature. If your baby seems to prefer it warmed, you can put the filled bottle in a container of warm water and let it stand for a few minutes. Check the temperature of the liquid on your skin before feeding it to your baby; to be sure it isn t too hot. Do not heat bottles in the microwave. Microwaves heat food and liquids unevenly, and this can cause hot spots that can burn your baby.    How do I clean and sterilize bottles?  Sterilize bottles and nipples before you use them for the first time. You can do this by putting them in boiling water for 5 minutes. After that first time, you can wash them in hot and soapy water. Rinse them carefully to be sure there is no soap left on them. You can also wash them in the .    [facility] [phone] ([phone])

## 2021-01-14 NOTE — LETTER
January 14, 2021      Sarah Will  [address] [address]        To Whom it May Concern,    Sarah Will is a patient of our clinic. She is currently 27 weeks pregnant with an estimated due date of 4/11/2021. Due to the ongoing COVID-19 pandemic, it is my recommendation that she be allowed to work remotely from 36 weeks of gestation (3/14/2021)  through the end of her pregnancy. If you have any questions, please do not hesitate to reach out to our office.      Sincerely,            Shahida Morrow MD

## 2021-01-15 ENCOUNTER — HEALTH MAINTENANCE LETTER (OUTPATIENT)
Age: 26
End: 2021-01-15

## 2021-01-15 LAB — TREPONEMA ANTIBODY (SYPHILIS): NEGATIVE

## 2021-02-09 ENCOUNTER — OFFICE VISIT (OUTPATIENT)
Dept: FAMILY MEDICINE | Facility: CLINIC | Age: 26
End: 2021-02-09
Payer: COMMERCIAL

## 2021-02-09 VITALS
RESPIRATION RATE: 22 BRPM | HEART RATE: 100 BPM | WEIGHT: 185.8 LBS | OXYGEN SATURATION: 100 % | HEIGHT: 63 IN | DIASTOLIC BLOOD PRESSURE: 78 MMHG | SYSTOLIC BLOOD PRESSURE: 123 MMHG | BODY MASS INDEX: 32.92 KG/M2 | TEMPERATURE: 98.4 F

## 2021-02-09 DIAGNOSIS — Z34.03 ENCOUNTER FOR SUPERVISION OF NORMAL FIRST PREGNANCY IN THIRD TRIMESTER: Primary | ICD-10-CM

## 2021-02-09 PROCEDURE — 99207 PR PRENATAL VISIT: CPT | Performed by: STUDENT IN AN ORGANIZED HEALTH CARE EDUCATION/TRAINING PROGRAM

## 2021-02-09 ASSESSMENT — MIFFLIN-ST. JEOR: SCORE: 1551.91

## 2021-02-09 NOTE — PROGRESS NOTES
"Sarah Will is a 25 year old  female who presents to clinic for a follow up OB visit. She is currently 31w2d with an estimated date of delivery of 2021 via early US. She denies headache, chest pain, shortness of breath, abdominal pain/contractions, vaginal bleeding, or abnormal discharge.   New concerns today: Is having some pelvic fullness but the back pain is better.   OB History    Para Term  AB Living   3 2 2 0 0 2   SAB TAB Ectopic Multiple Live Births   0 0 0 0 2      # Outcome Date GA Lbr David/2nd Weight Sex Delivery Anes PTL Lv   3 Current            2 Term 18 40w4d  3.487 kg (7 lb 11 oz) M  None N ADRIENNE      Birth Comments: perineal laceration 2nd degree, length- 21 inches, 36.2 cm      Name: Jake Pollard      Apgar1: 8  Apgar5: 9   1 Term 12   3.118 kg (6 lb 14 oz) F Vag-Spont   ADRIENNE       Physical exam:  /78   Pulse 100   Temp 98.4  F (36.9  C)   Resp 22   Ht 1.6 m (5' 3\")   Wt 84.3 kg (185 lb 12.8 oz)   LMP 2020   SpO2 100%   BMI 32.91 kg/m      General: alert, female in no acute distress  Abdomen: gravid uterus appropriate for gestation age at 32 cm above pubic symphysis, non-tender, FHTs 140s  Extremities: no edema    Assessment/Plan:  Patient Active Problem List   Diagnosis     Encounter for supervision of other normal pregnancy, second trimester     Provided numbers for clinic and hospital today  Will see patient again in 3 weeks  HepB negative  GBS-due at 37 weeks  Passed 1hr GTT    All questions answered today      Zulma Pabon MD          "

## 2021-02-09 NOTE — PATIENT INSTRUCTIONS
If you are concerned about something and the clinic is closed:  1) can call clinic phone number [phone] and resident will call you back  2) can call the hospital  [phone] (Maternity care).  A nurse will talk to you     [facility] Information  If you have any further concerns or wish to schedule another appointment, please call our office at [phone] during normal business hours (8-5, M-F).     For uncomplicated pregnancies, you will be seeing your doctor once a month until you are 28 weeks, then you will see your doctor twice a month. You will begin weekly visits at 36 weeks until you deliver.     If you have urgent medical questions that cannot wait, you may call [phone] at any time of day.     If you have a medical emergency, please call [phone].    When to call or come in to the hospital  If you notice a decrease in your baby's movement.   If your begin to experience contractions that are 5 minutes or less apart and lasting for over 45 seconds, or if contractions are becoming more painful.  If you have any bleeding or leakage of fluids.   If you have a headache not resolved with tylenol, right upper abdominal pain, or sudden onset of swelling.  You know your body best. Never hesitate to call or go to the hospital if something doesn't feel right!    Preparing for the hospital  You re likely feeling anxious as your child s birth approaches. This is normal. To give yourself some peace of mind, pack a bag 3-4 weeks before your due date. Here is a list of things to remember:  Personal care items like toothbrush, hair brush, lip balm, lotion, shampoo, glasses, contacts  Nightgown, bathrobe, slippers  Several pairs of underwear  Comfortable clothes for you to wear home  Camera with new batteries  Cell phone and   Insurance information and any other paperwork needed for your hospital stay  Clothes for baby to wear home  An infant, rear-facing car seat for bringing home your baby (this is  required by law)    Controlling Back Pain  As your body changes during pregnancy, your back must work in new ways. Back pain is due to many causes. Physical changes in your body can strain your back and its supporting muscles. Also, hormones (chemicals that carry messages throughout the body) increase during pregnancy. This can affect how the muscles and joints work together. All of these changes can lead to pain in the upper or lower back or pelvis. Some pregnant women have sciatica, pain caused by pressure on the sciatic nerve running down the back of the leg. Ask your healthcare provider for specific tips and exercises to help control your back pain.    Tips to Help You Rest  Good rest and sleep will help you feel better. Here are some ideas:  Ask your partner to massage your shoulders, neck, or back.  Limit the errands you do each day.  Lie down in the afternoon or after work for a few minutes.  Take a warm bath before you go to sleep.  Drink warm milk or teas without caffeine.  Avoid coffee, black tea, and cola.    Preventing Heartburn  Avoid spicy or acidic foods.   Eat small amounts more often.  Wait 2 hours after eating before lying down   Sleep with your upper body raised 6 inches.  May use Tums as needed. Talk to your doctor about other medications to try.

## 2021-03-02 ENCOUNTER — OFFICE VISIT (OUTPATIENT)
Dept: FAMILY MEDICINE | Facility: CLINIC | Age: 26
End: 2021-03-02
Payer: COMMERCIAL

## 2021-03-02 VITALS
OXYGEN SATURATION: 100 % | SYSTOLIC BLOOD PRESSURE: 123 MMHG | TEMPERATURE: 97.9 F | HEIGHT: 63 IN | BODY MASS INDEX: 33.77 KG/M2 | RESPIRATION RATE: 22 BRPM | HEART RATE: 104 BPM | WEIGHT: 190.6 LBS | DIASTOLIC BLOOD PRESSURE: 81 MMHG

## 2021-03-02 DIAGNOSIS — Z34.03 ENCOUNTER FOR SUPERVISION OF NORMAL FIRST PREGNANCY IN THIRD TRIMESTER: Primary | ICD-10-CM

## 2021-03-02 PROCEDURE — 99207 PR PRENATAL VISIT: CPT | Performed by: STUDENT IN AN ORGANIZED HEALTH CARE EDUCATION/TRAINING PROGRAM

## 2021-03-02 ASSESSMENT — MIFFLIN-ST. JEOR: SCORE: 1577.65

## 2021-03-02 NOTE — PROGRESS NOTES
"Sarah Will is a 25 year old  female who presents to clinic for a follow up OB visit. She is currently 34w2d with an estimated date of delivery of 2021 via early US. She denies headache, chest pain, shortness of breath, abdominal pain/contractions, vaginal bleeding, or abnormal discharge. She has felt baby move.     New concerns today:  none        OB History    Para Term  AB Living   3 2 2 0 0 2   SAB TAB Ectopic Multiple Live Births   0 0 0 0 2      # Outcome Date GA Lbr David/2nd Weight Sex Delivery Anes PTL Lv   3 Current            2 Term 18 40w4d  3.487 kg (7 lb 11 oz) M  None N ADRIENNE      Birth Comments: perineal laceration 2nd degree, length- 21 inches, 36.2 cm      Name: Jake Pollard      Apgar1: 8  Apgar5: 9   1 Term 12   3.118 kg (6 lb 14 oz) F Vag-Spont   ADRIENNE       Physical exam:  /81   Pulse 104   Temp 97.9  F (36.6  C)   Resp 22   Ht 1.607 m (5' 3.25\")   Wt 86.5 kg (190 lb 9.6 oz)   LMP 2020   SpO2 100%   BMI 33.50 kg/m      General: alert, female in no acute distress  Abdomen: gravid uterus appropriate for gestation age at  cm above pubic symphysis, non-tender, FHTs 140s  Extremities: no edema    Assessment/Plan:  Patient Active Problem List   Diagnosis     Encounter for supervision of other normal pregnancy, second trimester       Plans for contraception, breastfeeding, circumcision reviewed.   Discussed preferences during labor.    Plan for labor: try natural   Contraception: thinking  Breastfeeding; no  Rubella: non-immune  Passed 1hr GTT  Blood type: O POS      GBS next visit    Follow up in 2 weeks for routine prenatal care.    Zulma Pabon MD            "

## 2021-03-02 NOTE — PATIENT INSTRUCTIONS
[facility] Information  If you have any further concerns or wish to schedule another appointment, please call our office at [phone] during normal business hours (8-5, M-F).     For uncomplicated pregnancies, you will be seeing your doctor once a month until you are 28 weeks, then you will see your doctor twice a month. You will begin weekly visits at 36 weeks until you deliver.     If you have urgent medical questions that cannot wait, you may call [phone] at any time of day.     If you have a medical emergency, please call [phone].    When to call or come in to the hospital  If you notice a decrease in your baby's movement.   If your begin to experience contractions that are 5 minutes or less apart and lasting for over 45 seconds, or if contractions are becoming more painful.  If you have any bleeding or leakage of fluids.   If you have a headache not resolved with tylenol, right upper abdominal pain, or sudden onset of swelling.  You know your body best. Never hesitate to call or go to the hospital if something doesn't feel right!    Preparing for the hospital  You re likely feeling anxious as your child s birth approaches. This is normal. To give yourself some peace of mind, pack a bag 3-4 weeks before your due date. Here is a list of things to remember:  Personal care items like toothbrush, hair brush, lip balm, lotion, shampoo, glasses, contacts  Nightgown, bathrobe, slippers  Several pairs of underwear  Comfortable clothes for you to wear home  Camera with new batteries  Cell phone and   Insurance information and any other paperwork needed for your hospital stay  Clothes for baby to wear home  An infant, rear-facing car seat for bringing home your baby (this is required by law)  Managing Labor Pain   There are many ways to manage pain during labor. It can often be done with no anesthesia or strong pain medications. Talk to your health care provider about any options you would like to  explore.   Help from Relaxation  Some of these are learned in special classes. Your health care provider can help you find classes. The hospital has a tub you can use during early labor. These methods may be of help to you:   Breathing techniques   A warm tub between contractions   Massage and therapeutic touch by your support person or labor    Reading materials that are comforting or inspiring   Music that is soothing   Hypnosis   Acupuncture and acupressure   Heat and cold applicatio  Help From Analgesics   Analgesics are mild medications that reduce pain. They can be used along with some relaxation methods. They can give you pain relief without total loss of feeling. They may lessen the pain of strong contractions. You may feel well enough to nap between contractions. They have little effect on your baby if given early in labor. This may be done by injection or by IV.   Help From Anesthetics   Anesthesia involves blockage of all feeling including pain. It can be given in the form of local anesthesia or general anesthesia.  Anesthesia is a type of medication to prevent pain. It is often used in labor. It may numb only one region of your body. This is called regional anesthesia. Or it may let you sleep during surgery. This is called general anesthesia. This type of medication is given by a trained specialist. When possible, regional anesthesia will be used. This is so you can be awake during your baby s birth.   Regional Anesthesia   Regional anesthesia may be used to numb your lower body for a vaginal or  birth. It does not go into your bloodstream. This means that little or none of it will reach your baby. There are two kinds:   Epidural. This is most often given while you sit up or lie on your side. A needle with a flexible tube (catheter) is put in your lower back. The needle is then removed. The anesthetic is sent through the catheter. A pump may be attached. This gives you a constant level of  anesthetic. An epidural often only partly affects muscle control. This means you should still be able to push for a vaginal birth.   Spinal. This is most often given in one dose right before delivery. It acts fast. You may sit up or lie down when it is injected. It may affect muscle control in your lower body. This includes the ability to push.    General Anesthesia   General anesthesia lets you sleep and keeps you free of pain during surgery. It may be used for a . It may be given as an injection. It may be given as an inhaled gas. Or it may be given as both. Delivery often occurs before the medication has reached the baby.

## 2021-03-22 ENCOUNTER — OFFICE VISIT (OUTPATIENT)
Dept: FAMILY MEDICINE | Facility: CLINIC | Age: 26
End: 2021-03-22
Payer: COMMERCIAL

## 2021-03-22 VITALS
OXYGEN SATURATION: 98 % | HEIGHT: 63 IN | HEART RATE: 104 BPM | SYSTOLIC BLOOD PRESSURE: 118 MMHG | TEMPERATURE: 98.8 F | RESPIRATION RATE: 22 BRPM | WEIGHT: 192.2 LBS | BODY MASS INDEX: 34.05 KG/M2 | DIASTOLIC BLOOD PRESSURE: 78 MMHG

## 2021-03-22 DIAGNOSIS — Z34.82 ENCOUNTER FOR SUPERVISION OF OTHER NORMAL PREGNANCY, SECOND TRIMESTER: Primary | ICD-10-CM

## 2021-03-22 PROCEDURE — 99207 PR PRENATAL VISIT: CPT | Performed by: STUDENT IN AN ORGANIZED HEALTH CARE EDUCATION/TRAINING PROGRAM

## 2021-03-22 RX ORDER — FERROUS SULFATE 325(65) MG
325 TABLET ORAL
Qty: 60 TABLET | Refills: 0 | Status: SHIPPED | OUTPATIENT
Start: 2021-03-22

## 2021-03-22 ASSESSMENT — MIFFLIN-ST. JEOR: SCORE: 1580.94

## 2021-03-22 NOTE — PROGRESS NOTES
"Sraah Will is a 25 year old  female who presents to clinic for a follow up OB visit. She is currently 37w1d with an estimated date of delivery of 2021 via early US. She denies headache, chest pain, shortness of breath, abdominal pain/contractions, vaginal bleeding, or abnormal discharge. She has felt baby move.     New Concerns today: has had some malissa lauren recently.  Lasted for a brief period.  Feeling baby move.      OB History    Para Term  AB Living   3 2 2 0 0 2   SAB TAB Ectopic Multiple Live Births   0 0 0 0 2      # Outcome Date GA Lbr David/2nd Weight Sex Delivery Anes PTL Lv   3 Current            2 Term 18 40w4d  3.487 kg (7 lb 11 oz) M  None N ADRIENNE      Birth Comments: perineal laceration 2nd degree, length- 21 inches, 36.2 cm      Name: Jake Pollard      Apgar1: 8  Apgar5: 9   1 Term 12   3.118 kg (6 lb 14 oz) F Vag-Spont   ADRIENNE       Physical exam:  LMP 2020    /78   Pulse 104   Temp 98.8  F (37.1  C)   Resp 22   Ht 1.6 m (5' 3\")   Wt 87.2 kg (192 lb 3.2 oz)   LMP 2020   SpO2 98%   BMI 34.05 kg/m        General: alert, female in no acute distress  Abdomen: gravid uterus appropriate for gestation age at 35 cm above pubic symphysis, non-tender, FHTs 130's, Leopold's maneuver reveals celphalic positioning  Gyn: 0cm/thick/-3, physiologic discharge  Extremities: no edema    Assessment/Plan:  Patient Active Problem List   Diagnosis     Encounter for supervision of other normal pregnancy, second trimester       GBS collected today  Rubella: non-immune.  Will need vaccination after delivery  Passed 1hr GTT  Last Hgb 21: 11.4.  Recommend daily irone supplement until delivery.   Reviewed indications to call labor and delivery.      Plan for labor: try natural   Contraception: thinking  Breastfeeding; no  Rubella: non-immune  Passed 1hr GTT  Blood type: O POS    Follow up in 1 week for routine prenatal visit, sooner if labor " symptoms.     Zulma Pabon MD

## 2021-03-23 LAB
ALLERGIC TO PENICILLIN: NO
GP B STREP AG SPEC QL LA: NEGATIVE

## 2021-03-29 ENCOUNTER — OFFICE VISIT (OUTPATIENT)
Dept: FAMILY MEDICINE | Facility: CLINIC | Age: 26
End: 2021-03-29
Payer: COMMERCIAL

## 2021-03-29 VITALS
DIASTOLIC BLOOD PRESSURE: 79 MMHG | WEIGHT: 194 LBS | BODY MASS INDEX: 34.38 KG/M2 | RESPIRATION RATE: 22 BRPM | TEMPERATURE: 98.4 F | OXYGEN SATURATION: 99 % | HEART RATE: 103 BPM | SYSTOLIC BLOOD PRESSURE: 121 MMHG | HEIGHT: 63 IN

## 2021-03-29 DIAGNOSIS — Z34.83 ENCOUNTER FOR SUPERVISION OF OTHER NORMAL PREGNANCY, THIRD TRIMESTER: Primary | ICD-10-CM

## 2021-03-29 PROCEDURE — 99207 PR PRENATAL VISIT: CPT | Performed by: STUDENT IN AN ORGANIZED HEALTH CARE EDUCATION/TRAINING PROGRAM

## 2021-03-29 ASSESSMENT — MIFFLIN-ST. JEOR: SCORE: 1588.98

## 2021-03-29 NOTE — PROGRESS NOTES
"Sarah Will is a 25 year old  female who presents to clinic for a follow up OB visit. She is currently 38w1d with an estimated date of delivery of 2021 via early US. She denies headache, chest pain, shortness of breath, abdominal pain/contractions, vaginal bleeding, or abnormal discharge. She has felt baby move.     New Concerns today: has had some malissa lauren recently.  Lasted for a brief period.  Feeling baby move.      OB History    Para Term  AB Living   3 2 2 0 0 2   SAB TAB Ectopic Multiple Live Births   0 0 0 0 2      # Outcome Date GA Lbr David/2nd Weight Sex Delivery Anes PTL Lv   3 Current            2 Term 18 40w4d  3.487 kg (7 lb 11 oz) M  None N ADRIENNE      Birth Comments: perineal laceration 2nd degree, length- 21 inches, 36.2 cm      Name: Jake Pollard      Apgar1: 8  Apgar5: 9   1 Term 12   3.118 kg (6 lb 14 oz) F Vag-Spont   ADRIENNE       Physical exam:  /79   Pulse 103   Temp 98.4  F (36.9  C)   Resp 22   Ht 1.6 m (5' 2.99\")   Wt 88 kg (194 lb)   LMP 2020   SpO2 99%   BMI 34.37 kg/m     /79   Pulse 103   Temp 98.4  F (36.9  C)   Resp 22   Ht 1.6 m (5' 2.99\")   Wt 88 kg (194 lb)   LMP 2020   SpO2 99%   BMI 34.37 kg/m        General: alert, female in no acute distress  Abdomen: gravid uterus appropriate for gestation age at 36 cm above pubic symphysis, non-tender, FHTs 130's, Leopold's maneuver reveals celphalic positioning  Gyn: cervical check defered today. Celphalic positioning by external examination today  Extremities: no edema    Assessment/Plan:  Patient Active Problem List   Diagnosis     Encounter for supervision of other normal pregnancy, second trimester       GBS negative  Rubella: non-immune.  Will need vaccination after delivery  Passed 1hr GTT  Last Hgb 21: 11.4.  Recommend daily irone supplement until delivery.   Reviewed indications to call labor and delivery.   (bleeding/pain/contrations/changes " in fetal movement)    Follow up visits:  -Growth/TAO 4/1/21 (measuring small x 2 weeks)  -Brittany Quiñonez 4/9/21  -Cm 4/14/21    Plan for labor: try natural   Contraception: thinking  Breastfeeding; no  Rubella: non-immune  Passed 1hr GTT  Blood type: O POS    Follow up in 1 week for routine prenatal visit, sooner if labor symptoms.     Zulma Pabon MD

## 2021-04-09 ENCOUNTER — OFFICE VISIT (OUTPATIENT)
Dept: FAMILY MEDICINE | Facility: CLINIC | Age: 26
End: 2021-04-09
Payer: COMMERCIAL

## 2021-04-09 VITALS
DIASTOLIC BLOOD PRESSURE: 73 MMHG | SYSTOLIC BLOOD PRESSURE: 109 MMHG | WEIGHT: 194 LBS | TEMPERATURE: 98.1 F | BODY MASS INDEX: 34.37 KG/M2 | OXYGEN SATURATION: 98 % | HEART RATE: 92 BPM | RESPIRATION RATE: 16 BRPM

## 2021-04-09 DIAGNOSIS — Z34.83 ENCOUNTER FOR SUPERVISION OF OTHER NORMAL PREGNANCY, THIRD TRIMESTER: ICD-10-CM

## 2021-04-09 PROCEDURE — 99207 PR PRENATAL VISIT: CPT | Mod: GC | Performed by: STUDENT IN AN ORGANIZED HEALTH CARE EDUCATION/TRAINING PROGRAM

## 2021-04-09 NOTE — PROGRESS NOTES
Preceptor Attestation:  Patient's case reviewed and discussed with the resident, Shahida Morrow MD, and I personally evaluated the patient. I agree with written assessment and plan of care.    Supervising Physician:  Caty Lopez MD   [facility]

## 2021-04-09 NOTE — PROGRESS NOTES
Sarah Will is a 25 year old  female who presents to clinic for a follow up OB visit. She is currently 39w5d with an estimated date of delivery of 2021 via 10w US. She denies headache, chest pain, shortness of breath, abdominal pain/contractions, vaginal bleeding, or abnormal discharge. She has felt baby move.     New concerns today: more [place]. Had more mucus discharge earlier in the week. Missed appointment for growth US/TAO measurement.    OB History    Para Term  AB Living   3 2 2 0 0 2   SAB TAB Ectopic Multiple Live Births   0 0 0 0 2      # Outcome Date GA Lbr David/2nd Weight Sex Delivery Anes PTL Lv   3 Current            2 Term 18 40w4d  3.487 kg (7 lb 11 oz) M  None N ADRIENNE      Birth Comments: perineal laceration 2nd degree, length- 21 inches, 36.2 cm      Name: Jake Pollard      Apgar1: 8  Apgar5: 9   1 Term 12   3.118 kg (6 lb 14 oz) F Vag-Spont   ADRIENNE       Physical exam:  /73   Pulse 92   Temp 98.1  F (36.7  C) (Oral)   Resp 16   Wt 88 kg (194 lb)   LMP 2020   SpO2 98%   BMI 34.37 kg/m      General: alert, female in no acute distress  Abdomen: gravid uterus appropriate for gestation age at 37 cm above pubic symphysis, non-tender, FHTs 145, Leopold's maneuver reveals cephalic positioning, confirmed with bedside US  Gyn: 1cm/30%/-1, no discharge  Extremities: no edema    Assessment/Plan:  Patient Active Problem List   Diagnosis     Encounter for supervision of other normal pregnancy, third trimester       GBS negative.   Rubella: non-immune.  Will need vaccination after delivery  Passed 1hr GTT  Last Hgb 21: 11.4.  Recommend daily iron supplement until delivery.    Plan for labor: try natural   Contraception: thinking  Breastfeeding; no     Reviewed indications to call labor and delivery.   (bleeding/pain/contrations/changes in fetal movement)  Reviewed signs/symptoms of labor and when to present to the hospital.     Follow up  in 1 week for routine prenatal visit, sooner if labor symptoms.     Shahida Morrow MD  [facility] Medicine Resident  P: [phone]    Precepted with: Caty Lopez MD

## 2021-04-09 NOTE — PATIENT INSTRUCTIONS
[facility] Information  If you have any further concerns or wish to schedule another appointment, please call our office at [phone] during normal business hours (8-5, M-F).     For uncomplicated pregnancies, you will be seeing your doctor once a month until you are 28 weeks, then you will see your doctor twice a month. You will begin weekly visits at 36 weeks until you deliver.     If you have urgent medical questions that cannot wait, you may call [phone] at any time of day.     If you have a medical emergency, please call [phone].    When to call or come in to the hospital    If you notice a decrease in your baby's movement.     If your begin to experience contractions that are 5 minutes or less apart and lasting for over 45 seconds, or if contractions are becoming more painful.    If you have any bleeding or leakage of fluids.     If you have a headache not resolved with tylenol, right upper abdominal pain, or sudden onset of swelling.    You know your body best. Never hesitate to call or go to the hospital if something doesn't feel right!    [facility]   Address: [address] [place] [address]. [place] [address]   Phone: [phone]    Preparing for the hospital  You re likely feeling anxious as your child s birth approaches. This is normal. To give yourself some peace of mind, pack a bag 3-4 weeks before your due date. Here is a list of things to remember:    Personal care items like toothbrush, hair brush, lip balm, lotion, shampoo, glasses, contacts    Nightgown, bathrobe, slippers    Several pairs of underwear    Comfortable clothes for you to wear home    Camera with new batteries    Cell phone and     Insurance information and any other paperwork needed for your hospital stay    Clothes for baby to wear home    An infant, rear-facing car seat for bringing home your baby (this is required by law)

## 2021-04-14 ENCOUNTER — MEDICAL CORRESPONDENCE (OUTPATIENT)
Dept: HEALTH INFORMATION MANAGEMENT | Facility: CLINIC | Age: 26
End: 2021-04-14

## 2021-04-14 ENCOUNTER — COMMUNICATION - HEALTHEAST (OUTPATIENT)
Dept: SCHEDULING | Facility: CLINIC | Age: 26
End: 2021-04-14

## 2021-04-15 ENCOUNTER — MEDICAL CORRESPONDENCE (OUTPATIENT)
Dept: HEALTH INFORMATION MANAGEMENT | Facility: CLINIC | Age: 26
End: 2021-04-15

## 2021-04-17 ENCOUNTER — MEDICAL CORRESPONDENCE (OUTPATIENT)
Dept: HEALTH INFORMATION MANAGEMENT | Facility: CLINIC | Age: 26
End: 2021-04-17

## 2021-05-18 ENCOUNTER — OFFICE VISIT (OUTPATIENT)
Dept: FAMILY MEDICINE | Facility: CLINIC | Age: 26
End: 2021-05-18
Payer: COMMERCIAL

## 2021-05-18 VITALS
RESPIRATION RATE: 22 BRPM | HEIGHT: 63 IN | BODY MASS INDEX: 30.83 KG/M2 | DIASTOLIC BLOOD PRESSURE: 85 MMHG | OXYGEN SATURATION: 99 % | SYSTOLIC BLOOD PRESSURE: 128 MMHG | WEIGHT: 174 LBS | HEART RATE: 67 BPM | TEMPERATURE: 98.7 F

## 2021-05-18 PROCEDURE — 99207 PR PRENATAL VISIT: CPT | Performed by: STUDENT IN AN ORGANIZED HEALTH CARE EDUCATION/TRAINING PROGRAM

## 2021-05-18 ASSESSMENT — MIFFLIN-ST. JEOR: SCORE: 1498.39

## 2021-05-18 NOTE — PROGRESS NOTES
HPI-Post Partum Visit -       Sarah Will is a 25 year old  female  without a significant past medical history who presents for a postpartum visit. OBSTETRIC HISTORY:This patient's mother is not on file.  EDC: This patient's mother is not on file.  This patient's mother is not on file.    Patient is sleeping well  Bleeding has stopped  Mood is good  No pain    Has had intercourse since delivery and this was fine.  No issues.      No questions today       Was Rubella non-immune at the time of preganncy but had MMR booster in hospital.     Bottle feeding baby and that is going well.        Patient Active Problem List   Diagnosis     Encounter for supervision of other normal pregnancy, third trimester       Current Outpatient Medications   Medication Sig Dispense Refill     ferrous sulfate (FEROSUL) 325 (65 Fe) MG tablet Take 1 tablet (325 mg) by mouth daily (with breakfast) (Patient not taking: Reported on 2021) 60 tablet 0     Prenatal Vit-Fe Fumarate-FA (PRENATAL MULTIVITAMIN W/IRON) 27-0.8 MG tablet Take 1 tablet by mouth daily (Patient not taking: Reported on 2021) 90 tablet 3                      Delivery date:       Patient had a  of viable boy           with no complications.          Accompanying Signs & Symptoms:                        Breast feeding: formula: Similac   Abdominal pain: no                       Bleeding since delivery: minimal        Contraception choice: none        Patient has had intercourse since delivery              and complains of No discomfort.        Last PAP: No results found for: PAP  Pt screened for postpartum depression and complaints are: NEGATIVE         PHQ9= 0        ANABELLA= 0     Allergies   Allergen Reactions     No Known Allergies             Review of Systems:   CONSTITUTIONAL: no fatigue, no unexpected change in weight  SKIN: no worrisome rashes or lesions  EYES: no acute vision problems or changes  ENT: no ear problems, no mouth problems, no  "throat problems  RESP: no significant cough, no shortness of breath  CV: no chest pain, no palpitations, no new or worsening peripheral edema  GI: no nausea, no vomiting, no constipation, no diarrhea  : no frequency, no dysuria, no hematuria  NEURO: no weakness, no dizziness, no headaches  ENDOCRINE: no temperature intolerance, no skin/hair changes  PSYCHIATRIC: NEGATIVE for changes in mood or trouble with sleep            Physical Exam:     Vitals:    05/18/21 1405   BP: 128/85   Pulse: 67   Resp: 22   Temp: 98.7  F (37.1  C)   SpO2: 99%   Weight: 78.9 kg (174 lb)   Height: 1.6 m (5' 3\")       GENERAL: healthy, alert, well nourished, well hydrated, no distress  HENT: ear canals- normal; TMs- normal; Nose- normal; Mouth- no ulcers, no lesions  NECK: no tenderness, no adenopathy, no asymmetry, no masses, no stiffness; thyroid- normal to palpation  RESP: lungs clear to auscultation - no rales, no rhonchi, no wheezes  CV: regular rates and rhythm, normal S1 S2, no S3 or S4 and no murmur, no click or rub -  ABDOMEN: soft, no tenderness, no  hepatosplenomegaly, no masses, normal bowel sounds      Office Visit on 03/22/2021   Component Date Value Ref Range Status     Group B Strep Antigen 03/22/2021 Negative  Negative Final     Allergic To Penicillin 03/22/2021 No   Final         Assessment and Plan   Sarah was seen today for post partum exam.    Diagnoses and all orders for this visit:    Routine postpartum follow-up         -Tdap for pertussis prophylaxis:  -Pap not indicated  -Contraception: none.  Discussed options with patient.  She will discuss with her .     Options for treatment and follow-up care were reviewed with the patient and/or guardian. Sarah Will and/or guardian engaged in the decision making process and verbalized understanding of the options discussed and agreed with the final plan.    Zulma Pabon MD                "

## 2021-09-25 ENCOUNTER — HEALTH MAINTENANCE LETTER (OUTPATIENT)
Age: 26
End: 2021-09-25

## 2021-11-19 ENCOUNTER — OFFICE VISIT (OUTPATIENT)
Dept: FAMILY MEDICINE | Facility: CLINIC | Age: 26
End: 2021-11-19
Payer: COMMERCIAL

## 2021-11-19 VITALS
OXYGEN SATURATION: 100 % | HEIGHT: 63 IN | BODY MASS INDEX: 32.07 KG/M2 | RESPIRATION RATE: 22 BRPM | WEIGHT: 181 LBS | TEMPERATURE: 98.2 F | SYSTOLIC BLOOD PRESSURE: 137 MMHG | DIASTOLIC BLOOD PRESSURE: 89 MMHG | HEART RATE: 89 BPM

## 2021-11-19 DIAGNOSIS — N76.0 VAGINITIS AND VULVOVAGINITIS: Primary | ICD-10-CM

## 2021-11-19 DIAGNOSIS — B96.89 BACTERIAL VAGINOSIS: ICD-10-CM

## 2021-11-19 DIAGNOSIS — N76.0 BACTERIAL VAGINOSIS: ICD-10-CM

## 2021-11-19 DIAGNOSIS — B37.31 YEAST INFECTION OF THE VAGINA: ICD-10-CM

## 2021-11-19 LAB
CLUE CELLS: PRESENT
TRICHOMONAS, WET PREP: ABNORMAL
WBC'S/HIGH POWER FIELD, WET PREP: ABNORMAL
YEAST, WET PREP: PRESENT

## 2021-11-19 PROCEDURE — 87591 N.GONORRHOEAE DNA AMP PROB: CPT

## 2021-11-19 PROCEDURE — 87210 SMEAR WET MOUNT SALINE/INK: CPT

## 2021-11-19 PROCEDURE — 87491 CHLMYD TRACH DNA AMP PROBE: CPT

## 2021-11-19 PROCEDURE — 99203 OFFICE O/P NEW LOW 30 MIN: CPT | Mod: 25

## 2021-11-19 RX ORDER — METRONIDAZOLE 500 MG/1
500 TABLET ORAL 2 TIMES DAILY
Qty: 14 TABLET | Refills: 0 | Status: SHIPPED | OUTPATIENT
Start: 2021-11-19 | End: 2021-11-26

## 2021-11-19 RX ORDER — FLUCONAZOLE 150 MG/1
150 TABLET ORAL ONCE
Qty: 1 TABLET | Refills: 0 | Status: SHIPPED | OUTPATIENT
Start: 2021-11-19 | End: 2021-11-19

## 2021-11-19 ASSESSMENT — MIFFLIN-ST. JEOR: SCORE: 1525.14

## 2021-11-19 NOTE — PROGRESS NOTES
"  Assessment & Plan     Bacterial vaginosis  Clue cells noted on wet prep, + odor, thin vaginal discharge.  - metroNIDAZOLE (FLAGYL) 500 MG tablet; Take 1 tablet (500 mg) by mouth 2 times daily for 7 days    Yeast infection of the vagina  - fluconazole (DIFLUCAN) 150 MG tablet; Take 1 tablet (150 mg) by mouth once for 1 dose    Vaginitis and vulvovaginitis  - Wet prep  - Neisseria gonorrhoeae PCR; Future  - Chlamydia trachomatis PCR; Future  - Neisseria gonorrhoeae PCR  - Chlamydia trachomatis PCR        No follow-ups on file.    Kavya Santillan MD  [facility] Smith is a 26 year old who presents for the following health issues     HPI     Vaginal Symptoms  Onset/Duration: A week ago  Description:  Vaginal Discharge: white   Itching (Pruritis): no  Burning sensation:  YES  Odor: no  Accompanying Signs & Symptoms:  Urinary symptoms: burning after peeing   Abdominal pain: no  Fever: no  History:   Sexually active: YES  New Partner: no  Possibility of Pregnancy:  no  Recent antibiotic use: no  Previous vaginitis issues: no  Precipitating or alleviating factors: drank cranberry juice  Therapies tried and outcome: none         Objective    BP (!) 137/94   Pulse 89   Temp 98.2  F (36.8  C)   Resp 22   Ht 1.6 m (5' 3\")   Wt 82.1 kg (181 lb)   LMP 11/02/2021   SpO2 100%   BMI 32.06 kg/m    Body mass index is 32.06 kg/m .  Physical Exam   GENERAL: healthy, alert and no distress  EYES: Eyes grossly normal to inspection, conjunctivae and sclerae normal  RESP: lungs clear to auscultation - no rales, rhonchi or wheezes  CV: regular rate and rhythm, no murmur  ABDOMEN: soft, nontender  SKIN: no suspicious lesions or rashes  - Cervix and vaginal wall with normal appearance on speculum exam. Thin, gray discharge noted. Some odor noted. Wet prep and G/C swabs taken. Results below. PH 4.1.    Results for orders placed or performed in visit on 11/19/21   Wet prep     Status: " Abnormal    Specimen: Vagina; Swab   Result Value Ref Range    Trichomonas Absent Absent    Yeast Present (A) Absent    Clue Cells Present (A) Absent    WBCs/high power field None None    Narrative    No pH  Odor present  Clue cells >20%  Many bacteria   Neisseria gonorrhoeae PCR     Status: Normal    Specimen: Endocervix; Swab   Result Value Ref Range    Neisseria gonorrhoeae Negative Negative   Chlamydia trachomatis PCR     Status: Normal    Specimen: Endocervix; Swab   Result Value Ref Range    Chlamydia trachomatis Negative Negative

## 2021-11-21 LAB
C TRACH DNA SPEC QL NAA+PROBE: NEGATIVE
N GONORRHOEA DNA SPEC QL NAA+PROBE: NEGATIVE

## 2021-11-26 NOTE — PROGRESS NOTES
Preceptor Attestation:  Patient's case reviewed and discussed with the resident, Kavya Santillan MD, and I personally evaluated the patient. I agree with written assessment and plan of care.    Supervising Physician:  Caty Lopez MD   [facility]

## 2022-03-06 ENCOUNTER — HEALTH MAINTENANCE LETTER (OUTPATIENT)
Age: 27
End: 2022-03-06

## 2022-04-26 NOTE — MR AVS SNAPSHOT
After Visit Summary   2017    Sarah Will    MRN: [id number]           Patient Information     Date Of Birth          1995        Visit Information        Provider Department      2017 4:20 PM Zulma Pabon MD [facility]        Today's Diagnoses     Normal pregnancy, third trimester    -  1       Follow-ups after your visit        Your next 10 appointments already scheduled     Dec 27, 2017  3:40 PM CST   RETURN OB with Bonnie Fu MD   [facility] ([facility])    [address] [place] [address]   [phone]            2018  8:40 AM CST   RETURN OB with Zulma Pabon MD   [facility] ([facility])    [address] [place] [address]   [phone]              Who to contact     Please call your clinic at [phone] to:    Ask questions about your health    Make or cancel appointments    Discuss your medicines    Learn about your test results    Speak to your doctor   If you have compliments or concerns about an experience at your clinic, or if you wish to file a complaint, please contact [facility] Patient Relations at [phone] or email us at [email or web address]         Additional Information About Your Visit        MyChart Information     Klinq is an electronic gateway that provides easy, online access to your medical records. With Klinq, you can request a clinic appointment, read your test results, renew a prescription or communicate with your care team.     To sign up for [employer] visit the website at [email or web address]   You will be asked to enter the access code listed below, as well as some personal information. Please follow the directions to create your username and password.     Your access code is: [email or web address]  Expires: 3/22/2018  9:00 AM     Your access code will  in 90 days. If you need help or a new  Last Refill:  verapamil 120 MG tablet 90 tablet 1 11/8/2021      Last Office Visit: 2/28/2022    Follow up Visit: 6/3/2022    Refill authorized per protocol.     "code, please contact your [facility] or call [phone] for assistance.        Care EveryWhere ID     This is your Care EveryWhere ID. This could be used by other organizations to access your [facility] medical records  [id number]        Your Vitals Were     Pulse Temperature Respirations Height Last Period Pulse Oximetry    91 98  F (36.7  C) 18 5' 2.8\" (159.5 cm) 04/27/2017 98%    BMI (Body Mass Index)                   32.23 kg/m2            Blood Pressure from Last 3 Encounters:   12/12/17 115/75   11/10/17 117/81   10/16/17 115/77    Weight from Last 3 Encounters:   12/12/17 180 lb 12.8 oz (82 kg)   11/10/17 174 lb (78.9 kg)   10/16/17 169 lb 12.8 oz (77 kg)              Today, you had the following     No orders found for display       Primary Care Provider Office Phone # Fax #    Zulma Nalini Pabon [phone]       [facility] [address] [place] [address]        Equal Access to Services     [facility]: Hadii aad ku hadasho Soomaali, waaxda luqadaha, qaybta kaalmada adejordanyada, shabbir roberts . So [facility] [phone].    ATENCIÓN: Si habla español, tiene a cox disposición servicios gratuitos de asistencia lingüística. [facility] [phone].    We comply with applicable federal civil rights laws and [place] laws. We do not discriminate on the basis of race, color, national origin, age, disability, sex, sexual orientation, or gender identity.            Thank you!     Thank you for choosing [facility]  for your care. Our goal is always to provide you with excellent care. Hearing back from our patients is one way we can continue to improve our services. Please take a few minutes to complete the written survey that you may receive in the mail after your visit with us. Thank you!             Your Updated Medication List - Protect others around you: Learn how to safely use, store and throw away your " medicines at www.disposemymeds.org.      Notice  As of 12/12/2017 11:59 PM    You have not been prescribed any medications.

## 2022-12-26 ENCOUNTER — HEALTH MAINTENANCE LETTER (OUTPATIENT)
Age: 27
End: 2022-12-26

## 2023-04-16 ENCOUNTER — HEALTH MAINTENANCE LETTER (OUTPATIENT)
Age: 28
End: 2023-04-16

## 2024-06-23 ENCOUNTER — HEALTH MAINTENANCE LETTER (OUTPATIENT)
Age: 29
End: 2024-06-23

## 2024-12-07 SDOH — HEALTH STABILITY: PHYSICAL HEALTH: ON AVERAGE, HOW MANY MINUTES DO YOU ENGAGE IN EXERCISE AT THIS LEVEL?: 30 MIN

## 2024-12-07 SDOH — HEALTH STABILITY: PHYSICAL HEALTH: ON AVERAGE, HOW MANY DAYS PER WEEK DO YOU ENGAGE IN MODERATE TO STRENUOUS EXERCISE (LIKE A BRISK WALK)?: 3 DAYS

## 2024-12-10 ENCOUNTER — OFFICE VISIT (OUTPATIENT)
Dept: FAMILY MEDICINE | Facility: CLINIC | Age: 29
End: 2024-12-10
Payer: COMMERCIAL

## 2024-12-10 DIAGNOSIS — Z12.4 CERVICAL CANCER SCREENING: Primary | ICD-10-CM

## 2024-12-10 DIAGNOSIS — R03.0 ELEVATED BLOOD PRESSURE READING WITHOUT DIAGNOSIS OF HYPERTENSION: ICD-10-CM

## 2024-12-10 DIAGNOSIS — Z11.59 NEED FOR HEPATITIS C SCREENING TEST: ICD-10-CM

## 2024-12-10 PROCEDURE — 99385 PREV VISIT NEW AGE 18-39: CPT | Mod: 25 | Performed by: STUDENT IN AN ORGANIZED HEALTH CARE EDUCATION/TRAINING PROGRAM

## 2024-12-10 PROCEDURE — G0145 SCR C/V CYTO,THINLAYER,RESCR: HCPCS | Performed by: STUDENT IN AN ORGANIZED HEALTH CARE EDUCATION/TRAINING PROGRAM

## 2024-12-10 PROCEDURE — 90480 ADMN SARSCOV2 VAC 1/ONLY CMP: CPT | Performed by: STUDENT IN AN ORGANIZED HEALTH CARE EDUCATION/TRAINING PROGRAM

## 2024-12-10 PROCEDURE — 90656 IIV3 VACC NO PRSV 0.5 ML IM: CPT | Performed by: STUDENT IN AN ORGANIZED HEALTH CARE EDUCATION/TRAINING PROGRAM

## 2024-12-10 PROCEDURE — 36415 COLL VENOUS BLD VENIPUNCTURE: CPT | Performed by: STUDENT IN AN ORGANIZED HEALTH CARE EDUCATION/TRAINING PROGRAM

## 2024-12-10 PROCEDURE — 86803 HEPATITIS C AB TEST: CPT | Performed by: STUDENT IN AN ORGANIZED HEALTH CARE EDUCATION/TRAINING PROGRAM

## 2024-12-10 PROCEDURE — 90471 IMMUNIZATION ADMIN: CPT | Performed by: STUDENT IN AN ORGANIZED HEALTH CARE EDUCATION/TRAINING PROGRAM

## 2024-12-10 PROCEDURE — 91320 SARSCV2 VAC 30MCG TRS-SUC IM: CPT | Performed by: STUDENT IN AN ORGANIZED HEALTH CARE EDUCATION/TRAINING PROGRAM

## 2024-12-10 PROCEDURE — 87624 HPV HI-RISK TYP POOLED RSLT: CPT | Performed by: STUDENT IN AN ORGANIZED HEALTH CARE EDUCATION/TRAINING PROGRAM

## 2024-12-10 PROCEDURE — 80048 BASIC METABOLIC PNL TOTAL CA: CPT | Performed by: STUDENT IN AN ORGANIZED HEALTH CARE EDUCATION/TRAINING PROGRAM

## 2024-12-10 PROCEDURE — 80061 LIPID PANEL: CPT | Performed by: STUDENT IN AN ORGANIZED HEALTH CARE EDUCATION/TRAINING PROGRAM

## 2024-12-10 NOTE — PROGRESS NOTES
aPreventive Care Visit  [facility]  Zulma Nalini Pabon MD, Family Medicine  Dec 10, 2024      Assessment & Plan     Cervical cancer screening-  - HPV and Gynecologic Cytology Panel - Recommended Age 30 - 65 Years  - Basic metabolic panel; Future  - Basic metabolic panel  - Gynecologic Cytology (PAP)    Need for hepatitis C screening test  - Hepatitis C Screen Reflex to HCV RNA Quant and Genotype; Future  - Hepatitis C Screen Reflex to HCV RNA Quant and Genotype    Elevated blood pressure reading without diagnosis of hypertension-likely white coat HTN.  Will check lipids and BMP  - Lipid Profile; Future  - Lipid Profile    Encouraged diet rich in fresh fruits, veg, lean meats.  Recommend 150min/week physical activity    Counseling  Appropriate preventive services were addressed with this patient via screening, questionnaire, or discussion as appropriate for fall prevention, nutrition, physical activity, Tobacco-use cessation, social engagement, weight loss and cognition.  Checklist reviewing preventive services available has been given to the patient.    Patient lonstanding patient of mine and prior OB.    Does not desire contraception at this time.  Will use natural family planning and hte withdrawal method for now.         Denia Smith is a 29 year old, presenting for the following:  Physical    Patient presents for general follow up   Patient believes she has whitecoat hypertension.  She had talked about this with a friend who is a nurse.  She does have a history of elevated blood pressures in clinic.  Because of this she has been checking blood pressures at home on her home cuff.  At home blood pressures are always normal.  Due for Pap smear and would like to have this done today.  Recently started a diet that includes a lot of fresh fruits and vegetables lean proteins.  Patient does not wish to be pregnant.  She reports that she and her  use the withdrawal method  and are happy with this as a form of birth control.  She is not interested in discussing hormonal birth control today.    HPI    No known family histroy.   Health Care Directive  Patient does not have a Health Care Directive: none      12/7/2024   General Health   How would you rate your overall physical health? (!) FAIR   Feel stress (tense, anxious, or unable to sleep) Not at all            12/7/2024   Nutrition   Three or more servings of calcium each day? Yes   Diet: Regular (no restrictions)   How many servings of fruit and vegetables per day? (!) 2-3   How many sweetened beverages each day? 0-1            12/7/2024   Exercise   Days per week of moderate/strenous exercise 3 days   Average minutes spent exercising at this level 30 min            12/7/2024   Social Factors   Worry food won't last until get money to buy more No   Food not last or not have enough money for food? No   Do you have housing? (Housing is defined as stable permanent housing and does not include staying ouside in a car, in a tent, in an abandoned building, in an overnight shelter, or couch-surfing.) Yes   Are you worried about losing your housing? No   Lack of transportation? No   Unable to get utilities (heat,electricity)? No         12/7/2024   Dental   Dentist two times every year? Yes         12/7/2024   TB Screening   Were you born outside of the [place]? No        Today's PHQ-2 Score:       12/10/2024     9:32 AM   PHQ-2 ( 1999 [employer])   Q1: Little interest or pleasure in doing things 0    Q2: Feeling down, depressed or hopeless 0    PHQ-2 Score 0    Q1: Little interest or pleasure in doing things Not at all   Q2: Feeling down, depressed or hopeless Not at all   PHQ-2 Score 0       Patient-reported         12/7/2024   Substance Use   Alcohol more than 3/day or more than 7/wk Not Applicable   Do you use any other substances recreationally? No      Social History     Tobacco Use    Smoking status: Never     Passive exposure: Never     "Smokeless tobacco: Never   Substance Use Topics    Alcohol use: Yes     Comment: there and here, not alot, none during pregnancy    Drug use: No         2024   STI Screening   New sexual partner(s) since last STI/HIV test? No     History of abnormal Pap smear: No - age 30-64 HPV with reflex Pap every 5 years recommended         2024   Contraception/Family Planning   Questions about contraception or family planning No     Reviewed and updated as needed this visit by Provider                    History reviewed. No pertinent past medical history.  OB History    Para Term  AB Living   3 2 2 0 0 2   SAB IAB Ectopic Multiple Live Births   0 0 0 0 2      # Outcome Date GA Lbr David/2nd Weight Sex Type Anes PTL Lv   3             2 Term 17 40w4d  3.487 kg (7 lb 11 oz) M  None N ADRIENNE      Birth Comments: perineal laceration 2nd degree, length- 21 inches, 36.2 cm      Name: Jake Pollard      Apgar1: 8  Apgar5: 9   1 Term 12   3.118 kg (6 lb 14 oz) F Vag-Spont   ADRIENNE         Review of Systems  Constitutional, HEENT, cardiovascular, pulmonary, gi and gu systems are negative, except as otherwise noted.     Objective    Exam  BP (!) 157/108   Pulse 112   Temp 98.3  F (36.8  C)   Resp 22   Ht 1.6 m (5' 3\")   Wt 73.9 kg (163 lb)   LMP 2024 (Exact Date)   SpO2 99%   BMI 28.87 kg/m     Estimated body mass index is 28.87 kg/m  as calculated from the following:    Height as of this encounter: 1.6 m (5' 3\").    Weight as of this encounter: 73.9 kg (163 lb).    Physical Exam  GENERAL: alert and no distress  EYES: Eyes grossly normal to inspection, PERRL and conjunctivae and sclerae normal  HENT: ear canals and TM's normal, nose and mouth without ulcers or lesions  NECK: no adenopathy, no asymmetry, masses, or scars  RESP: lungs clear to auscultation - no rales, rhonchi or wheezes  CV: regular rate and rhythm, normal S1 S2, no S3 or S4, no murmur, click or rub, no peripheral " edema  ABDOMEN: soft, nontender, no hepatosplenomegaly, no masses and bowel sounds normal  MS: no gross musculoskeletal defects noted, no edema  SKIN: no suspicious lesions or rashes  NEURO: Normal strength and tone, mentation intact and speech normal  PSYCH: mentation appears normal, affect normal/bright  : normal external perineum, vaginal canal, and cervix.  Pap collected.

## 2024-12-11 VITALS
SYSTOLIC BLOOD PRESSURE: 141 MMHG | WEIGHT: 163 LBS | TEMPERATURE: 98.3 F | HEIGHT: 63 IN | HEART RATE: 112 BPM | RESPIRATION RATE: 22 BRPM | OXYGEN SATURATION: 99 % | BODY MASS INDEX: 28.88 KG/M2 | DIASTOLIC BLOOD PRESSURE: 99 MMHG

## 2024-12-11 LAB
ANION GAP SERPL CALCULATED.3IONS-SCNC: 11 MMOL/L (ref 7–15)
BUN SERPL-MCNC: 13.6 MG/DL (ref 6–20)
CALCIUM SERPL-MCNC: 10.5 MG/DL (ref 8.8–10.4)
CHLORIDE SERPL-SCNC: 106 MMOL/L (ref 98–107)
CHOLEST SERPL-MCNC: 181 MG/DL
CREAT SERPL-MCNC: 0.71 MG/DL (ref 0.51–0.95)
EGFRCR SERPLBLD CKD-EPI 2021: >90 ML/MIN/1.73M2
FASTING STATUS PATIENT QL REPORTED: ABNORMAL
FASTING STATUS PATIENT QL REPORTED: ABNORMAL
GLUCOSE SERPL-MCNC: 96 MG/DL (ref 70–99)
HCO3 SERPL-SCNC: 25 MMOL/L (ref 22–29)
HCV AB SERPL QL IA: NONREACTIVE
HDLC SERPL-MCNC: 59 MG/DL
HPV HR 12 DNA CVX QL NAA+PROBE: NEGATIVE
HPV16 DNA CVX QL NAA+PROBE: NEGATIVE
HPV18 DNA CVX QL NAA+PROBE: NEGATIVE
HUMAN PAPILLOMA VIRUS FINAL DIAGNOSIS: NORMAL
LDLC SERPL CALC-MCNC: 104 MG/DL
NONHDLC SERPL-MCNC: 122 MG/DL
POTASSIUM SERPL-SCNC: 4.3 MMOL/L (ref 3.4–5.3)
SODIUM SERPL-SCNC: 142 MMOL/L (ref 135–145)
TRIGL SERPL-MCNC: 88 MG/DL

## 2024-12-11 NOTE — NURSING NOTE
HOME BP READINGS:    11/11/24  117/82  85    11/14/24  110/82  80     11/17/24  102/71  70    11/18/24  106/75   69     12/9/24  113/83  82    12/10/24  111/84  78

## 2024-12-13 LAB
BKR AP ASSOCIATED HPV REPORT: NORMAL
BKR LAB AP GYN ADEQUACY: NORMAL
BKR LAB AP GYN INTERPRETATION: NORMAL
BKR LAB AP PREVIOUS ABNORMAL: NORMAL
PATH REPORT.COMMENTS IMP SPEC: NORMAL
PATH REPORT.COMMENTS IMP SPEC: NORMAL
PATH REPORT.RELEVANT HX SPEC: NORMAL

## 2025-02-12 ENCOUNTER — OFFICE VISIT (OUTPATIENT)
Dept: FAMILY MEDICINE | Facility: CLINIC | Age: 30
End: 2025-02-12
Payer: COMMERCIAL

## 2025-02-12 VITALS
WEIGHT: 150 LBS | TEMPERATURE: 97.7 F | SYSTOLIC BLOOD PRESSURE: 117 MMHG | BODY MASS INDEX: 26.58 KG/M2 | OXYGEN SATURATION: 100 % | DIASTOLIC BLOOD PRESSURE: 88 MMHG | HEIGHT: 63 IN | HEART RATE: 88 BPM | RESPIRATION RATE: 20 BRPM

## 2025-02-12 DIAGNOSIS — F10.10 ALCOHOL CONSUMPTION BINGE DRINKING: Primary | ICD-10-CM

## 2025-02-12 LAB
ALBUMIN SERPL BCG-MCNC: 4.7 G/DL (ref 3.5–5.2)
ALP SERPL-CCNC: 70 U/L (ref 40–150)
ALT SERPL W P-5'-P-CCNC: 18 U/L (ref 0–50)
ANION GAP SERPL CALCULATED.3IONS-SCNC: 12 MMOL/L (ref 7–15)
AST SERPL W P-5'-P-CCNC: 17 U/L (ref 0–45)
BILIRUB DIRECT SERPL-MCNC: 0.21 MG/DL (ref 0–0.3)
BILIRUB SERPL-MCNC: 1 MG/DL
BUN SERPL-MCNC: 7.1 MG/DL (ref 6–20)
CALCIUM SERPL-MCNC: 10 MG/DL (ref 8.8–10.4)
CHLORIDE SERPL-SCNC: 107 MMOL/L (ref 98–107)
CREAT SERPL-MCNC: 0.7 MG/DL (ref 0.51–0.95)
EGFRCR SERPLBLD CKD-EPI 2021: >90 ML/MIN/1.73M2
ERYTHROCYTE [DISTWIDTH] IN BLOOD BY AUTOMATED COUNT: 13 % (ref 10–15)
GLUCOSE SERPL-MCNC: 97 MG/DL (ref 70–99)
HCO3 SERPL-SCNC: 22 MMOL/L (ref 22–29)
HCT VFR BLD AUTO: 45.2 % (ref 35–47)
HGB BLD-MCNC: 14.3 G/DL (ref 11.7–15.7)
MCH RBC QN AUTO: 28.6 PG (ref 26.5–33)
MCHC RBC AUTO-ENTMCNC: 31.6 G/DL (ref 31.5–36.5)
MCV RBC AUTO: 90 FL (ref 78–100)
PLATELET # BLD AUTO: 326 10E3/UL (ref 150–450)
POTASSIUM SERPL-SCNC: 4.5 MMOL/L (ref 3.4–5.3)
PROT SERPL-MCNC: 8 G/DL (ref 6.4–8.3)
RBC # BLD AUTO: 5 10E6/UL (ref 3.8–5.2)
SODIUM SERPL-SCNC: 141 MMOL/L (ref 135–145)
WBC # BLD AUTO: 6.2 10E3/UL (ref 4–11)

## 2025-02-12 NOTE — PROGRESS NOTES
Assessment & Plan     Alcohol consumption binge drinking-discussed binge drinking with patient today.  We will check liver function kidney function and blood panel.  It is very reassuring that the patient is able to stop drinking when she would like to and does not have any symptoms of withdrawal.  It does not sound as though cravings are a significant issue to the patient.  At this time she is already decided to stop drinking.  We did review general recommendations for safe drinking but that there is no known safe level of drinking and that it is hard to tell on an individual patient level how much 1 in person can drink at a time.  Patient reports that she is very motivated to quit drinking so she can be there for her children and this is quite important to her.  She would like to make sure that she is not a reasonable state of health today.  I will follow-up with patient on labs with [facility] through [employer].  I did encourage patient to let me know if she has difficulties stopping drinking and because there are medications we can use if it is an issue.  She does not think will be an issue in the future but will let me know.  - Hepatic function panel; Future  - CBC with platelets; Future  - Basic metabolic panel; Future  - Hepatic function panel  - CBC with platelets  - Basic metabolic panel        Denia Smith is a 29 year old, presenting for the following health issues:  Chills on and off       2/12/2025     8:07 AM   Additional Questions   Roomed by Heather   Accompanied by LUIZ         2/12/2025    Information    services provided? No     HPI       Patient presents today for concerns about health.  She reports that over the holidays and in the last few months she is drinking more heavily on the weekends.  She drinks more than 6 shots in an evening and does become intoxicated.  She reports that everyone around her is also doing this.  She finds that her group is also finding increasing  "reasons for her alcohol consumption including birthdays, holidays, Super Bowl, etc.  She started to become certain concerned that she is damaging her liver and her health because of this.    At the end of last month she did have an episode where she had chills nausea and vomiting.  She was worried this was because of alcohol use.  She has decided that she does not want to drink anymore.  She does not drink during the week and she reports that that is not hard for her to not drink.  She does not think she needs anything to help her quit drinking.    Patient did go on to [employer] to see what could be causing her problems and became quite alarmed when it indicated that she could have liver failure or other terminal health conditions.  She is quite frail today.  And would like a health checkup.          Objective    /88   Pulse 88   Temp 97.7  F (36.5  C)   Resp 20   Ht 1.6 m (5' 3\")   Wt 68 kg (150 lb)   LMP 02/08/2025 (Approximate)   SpO2 100%   BMI 26.57 kg/m    Body mass index is 26.57 kg/m .  Physical Exam     GEN: NAD  HEENT: head atraumatic, normocephalic, moist mucous membranes, eyes anicteric  CV: RRR w/o M/R/G  PULM: CTAB  ABD: soft, non-tender, no appreciable masses, no guarding, BS present  Neuro: alert and oriented x 3, CN II-XII intact, normal gross motor movements  Psych: appropriate  Ext: no peripheral edema      Signed Electronically by: Zulma Pabon MD    "

## 2025-03-04 ENCOUNTER — OFFICE VISIT (OUTPATIENT)
Dept: FAMILY MEDICINE | Facility: CLINIC | Age: 30
End: 2025-03-04
Payer: COMMERCIAL

## 2025-03-04 VITALS
WEIGHT: 147 LBS | TEMPERATURE: 98.6 F | BODY MASS INDEX: 25.1 KG/M2 | RESPIRATION RATE: 18 BRPM | DIASTOLIC BLOOD PRESSURE: 85 MMHG | HEART RATE: 96 BPM | OXYGEN SATURATION: 100 % | HEIGHT: 64 IN | SYSTOLIC BLOOD PRESSURE: 120 MMHG

## 2025-03-04 DIAGNOSIS — Z71.89 COUNSELING ON HEALTH PROMOTION AND DISEASE PREVENTION: Primary | ICD-10-CM

## 2025-03-04 PROCEDURE — 99213 OFFICE O/P EST LOW 20 MIN: CPT | Performed by: STUDENT IN AN ORGANIZED HEALTH CARE EDUCATION/TRAINING PROGRAM

## 2025-03-04 PROCEDURE — 3079F DIAST BP 80-89 MM HG: CPT | Performed by: STUDENT IN AN ORGANIZED HEALTH CARE EDUCATION/TRAINING PROGRAM

## 2025-03-04 PROCEDURE — 3074F SYST BP LT 130 MM HG: CPT | Performed by: STUDENT IN AN ORGANIZED HEALTH CARE EDUCATION/TRAINING PROGRAM

## 2025-03-04 NOTE — PROGRESS NOTES
"  Assessment & Plan     Counseling on health promotion and disease prevention-I reviewed patient's symptoms with her today.  Reviewed reassuring lab from last visit.  Since her last visit patient has stopped drinking alcohol.  I do believe her symptoms are consistent with intermittent infection which are likely from her children who are young and in school.  Educated patient on frequency of illness when raising young children.  Today she has a reassuring exam and is feeling well.  We talked about plans for coping with stress today as well as family stress and responsibilities.  No medications needed today.  Patient is welcome and encouraged to come back if she is not feeling better in the future.  For now no immediate follow-up is planned.          Subjective   Sarah is a 29 year old, presenting for the following health issues:  Abdominal Pain (Going for few weeks now. )      3/4/2025     9:08 AM   Additional Questions   Roomed by Siobhan   Accompanied by Self         3/4/2025    Information    services provided? No     HPI      Patient with buring and aching pain-  Right front and rib.  Right anterior and lateral side is painful and \"swollen\"   Pain achy and sharp.      Chills reoccurred on the 23rd x 1 without fever    BP readings at home:   2/24,110/76 3/2 109/78, 3/4 117/78  BP was elevated last visit and has been checking BPs at home.         Objective    /85 (BP Location: Right arm, Patient Position: Sitting)   Pulse 96   Temp 98.6  F (37  C) (Tympanic)   Resp 18   Ht 1.62 m (5' 3.78\")   Wt 66.7 kg (147 lb)   LMP 02/07/2025 (Exact Date)   SpO2 100%   BMI 25.41 kg/m    Body mass index is 25.41 kg/m .  Physical Exam   GEN: NAD  HEENT: head atraumatic, normocephalic, moist mucous membranes, eyes anicteric  CV: RRR w/o M/R/G  PULM: CTAB  ABD: soft, non-tender, no appreciable masses, no guarding, BS present  Neuro: alert and oriented x 3, CN II-XII intact, normal gross motor " movements  Psych: appropriate  Ext: no peripheral edema          Signed Electronically by: Zulma Pabon MD

## 2025-07-12 ENCOUNTER — HEALTH MAINTENANCE LETTER (OUTPATIENT)
Age: 30
End: 2025-07-12